# Patient Record
Sex: MALE | Race: WHITE | Employment: FULL TIME | URBAN - METROPOLITAN AREA
[De-identification: names, ages, dates, MRNs, and addresses within clinical notes are randomized per-mention and may not be internally consistent; named-entity substitution may affect disease eponyms.]

---

## 2017-03-02 ENCOUNTER — VIRTUAL VISIT (OUTPATIENT)
Dept: FAMILY MEDICINE | Facility: OTHER | Age: 57
End: 2017-03-02

## 2017-03-02 NOTE — PROGRESS NOTES
Date:   Clinician: Elie Moseley  Clinician NPI: [id number]  Patient: Audi Aguilar  Patient : 1960  Patient Address: [address] [place] [address]  Patient Phone: [phone]  Visit Protocol: URI  Patient Summary:  Audi is a 56 year old ( : 1960 ) male who initiated a Zip for evaluation of bronchitis.      His symptoms started gradually 3-6 days ago and consist of rhinitis, dysphagia, cough, post-nasal drainage, hoarse voice, sore throat, chills, nasal congestion, and malaise.   He denies dyspnea, loss of appetite, ear pain, itchy eyes, vomiting, nausea, chest pain, petechial or purpuric rash, facial pain or pressure, headache, myalgias, and fever. He denies a history of facial surgery.   His moderate nasal secretions are clear.   He has a mildly painful sore throat. When Audi swallows liquids or saliva, he experiences mild pain. The patient denies having white spots on the tonsils similar to a sample strep throat image provided. He has not been exposed to Strep. When asked to feel his neck he could not tell if lymph nodes were enlarged. He denies axillary lymphadenopathy.   His moderately severe (cough every 5-10 minutes) productive cough is NOT more bothersome at night. He believes the cough is not caused by post-nasal drainage. His cough produces green sputum. He experiences mild wheezing on a regular basis.   He denies rigors.   Audi denies having COPD or other chronic lung disease.   Pulse: self-reported pulse rate as: 15 beats in 10 seconds.   Audi started taking an angiotensin receptor blocker (ARB) more than two weeks ago.   Weight (in lbs): 200   Audi does not smoke or use smokeless tobacco.   Additional information as reported by the patient (free text): Been taking Nyquil at night to sleep easier. Didn't see it on the list.   MEDICATIONS:  Losartan (Cozaar) and atorvastatin (Lipitor)   Patient free text response:  None  , ALLERGIES:  NKDA   Clinician Response:   Dear Audi,  Based on the information you have provided, you likely have viral bronchitis.   I am prescribing benzonatate (Tessalon Perles) 100 mg to treat your cough. Take one to two tablets by mouth three times a day as needed for cough. There are no refills with this prescription.   Unless your are allergic to the over-the-counter medication(s) below, I recommend using:   Guaifenesin + dextromethorphan (Robitussin DM, Mucinex DM). This is an over-the-counter medication that does not require a prescription.   A decongestant such as pseudoephedrine (Sudafed or store brand) to help your symptoms. This is an over-the-counter medication that does not require a prescription.   Ibuprofen. Take 1-3 200mg tablets (200-600mg) every 8 hours to help with the discomfort. Make sure to take the ibuprofen with food. Do not exceed 2400mg in 24 hours. This is an over-the-counter medication that does not require a prescription.   Try the following to help with your throat pain and discomfort:     Use throat lozenges    Gargle with warm salt water (1/4 teaspoon of salt per 8 ounce glass of water).    Suck on frozen items such as Popsicles or ice cubes.     Call 911 or go to the emergency room if you feel that your throat is closing off, you suddenly develop a rash, you are unable to swallow fluids, you are drooling, or you are having difficulty breathing.  Follow up with your primary care clinician if your symptoms are not improving in 3-4 days.   Because your condition is most likely caused by a virus, antibiotics will not help you get better. Inappropriately treating a viral infection with antibiotics may cause harmful side-effects. In fact, antibiotics may make you feel worse.  For more information on why I am not prescribing antibiotics, please watch this video: Antibiotics Won't Help You.   Drink plenty of liquids, especially water and take time to rest your body. This may mean taking a nap or going to bed earlier. Your body is  fighting a virus and liquids and rest will improve the pace of recovery. Remember to regularly wash your hands and avoid close contact with others to prevent spreading your condition.   Diagnosis: Viral Bronchitis possible  Diagnosis ICD: J40  Prescription: benzonatate (Tessalon Perles) 100mg oral tablet 30 tablets, 5 days supply. Take one to two tablets by mouth three times a day as needed. disp. 30. Refills: 0, Refill as needed: no, Allow substitutions: yes  Prescription Sent At: March 02 14:59:23, 2017  Pharmacy: [id number] - [phone] - [address] [place] [address]

## 2017-03-14 ENCOUNTER — OFFICE VISIT (OUTPATIENT)
Dept: FAMILY MEDICINE | Facility: CLINIC | Age: 57
End: 2017-03-14
Payer: COMMERCIAL

## 2017-03-14 VITALS
DIASTOLIC BLOOD PRESSURE: 70 MMHG | HEART RATE: 75 BPM | TEMPERATURE: 98.5 F | WEIGHT: 203.4 LBS | SYSTOLIC BLOOD PRESSURE: 118 MMHG | HEIGHT: 71 IN | BODY MASS INDEX: 28.48 KG/M2 | OXYGEN SATURATION: 96 %

## 2017-03-14 DIAGNOSIS — Z11.59 NEED FOR HEPATITIS C SCREENING TEST: ICD-10-CM

## 2017-03-14 DIAGNOSIS — J20.9 ACUTE BRONCHITIS WITH SYMPTOMS > 10 DAYS: Primary | ICD-10-CM

## 2017-03-14 PROCEDURE — 99213 OFFICE O/P EST LOW 20 MIN: CPT | Performed by: PHYSICIAN ASSISTANT

## 2017-03-14 PROCEDURE — 36415 COLL VENOUS BLD VENIPUNCTURE: CPT | Performed by: PHYSICIAN ASSISTANT

## 2017-03-14 PROCEDURE — 86803 HEPATITIS C AB TEST: CPT | Performed by: PHYSICIAN ASSISTANT

## 2017-03-14 RX ORDER — AZITHROMYCIN 250 MG/1
TABLET, FILM COATED ORAL
Qty: 6 TABLET | Refills: 0 | Status: SHIPPED | OUTPATIENT
Start: 2017-03-14 | End: 2017-03-23

## 2017-03-14 NOTE — PROGRESS NOTES
"  SUBJECTIVE:                                                      SUBJECTIVE:  Audi Aguilar is a 56 year old male who presents to the clinic today with a chief complaint of cough  for 2.5 week(s).  His cough is described as productive of yellow sputum. Patient has worsened over the past couple of days.    The patient's symptoms are severe and worsening.  Associated symptoms include congestion, rhinorrhea and wheezing. The patient's symptoms are exacerbated by no particular triggers  Patient has been using Tessalon  to improve symptoms. Non Smoker.     Past Medical History   Diagnosis Date     Depressive disorder, not elsewhere classified      Migraine, unspecified, without mention of intractable migraine without mention of status migrainosus        Current Outpatient Prescriptions   Medication Sig Dispense Refill     losartan (COZAAR) 50 MG tablet Take 1 tablet (50 mg) by mouth daily 90 tablet prn     atorvastatin (LIPITOR) 20 MG tablet Take 1 tablet (20 mg) by mouth daily 90 tablet 3     Calcium Polycarbophil (FIBER-CAPS PO)        KRILL OIL PO Take by mouth daily       CALCIUM CITRATE daily       Coenzyme Q10 (CO Q 10 PO) Take  by mouth.       Multiple Vitamin (DAILY MULTIVITAMIN PO) Take  by mouth.       Cholecalciferol (VITAMIN D-3 PO) Take  by mouth.         Social History   Substance Use Topics     Smoking status: Never Smoker     Smokeless tobacco: Never Used     Alcohol use 0.0 oz/week     0 Standard drinks or equivalent per week      Comment: socially       ROS  Review of systems negative except as stated above.    OBJECTIVE:  /70  Pulse 75  Temp 98.5  F (36.9  C) (Oral)  Ht 5' 11.02\" (1.804 m)  Wt 203 lb 6.4 oz (92.3 kg)  SpO2 96%  BMI 28.35 kg/m2  GENERAL APPEARANCE: healthy, alert and no distress  EYES: EOMI,  PERRL, conjunctiva clear  HENT: TM's normal bilaterally, nasal turbinates erythematous, swollen and rhinorrhea clear  NECK: supple, nontender, no lymphadenopathy  RESP: lungs clear to " auscultation - no rales, rhonchi or wheezes  CV: regular rates and rhythm, normal S1 S2, no murmur noted  ABDOMEN:  soft, nontender, no HSM or masses and bowel sounds normal  NEURO: Normal strength and tone, sensory exam grossly normal,  normal speech and mentation  SKIN: no suspicious lesions or rashes    ASSESSMENT/PLAN:  1. Acute bronchitis with symptoms > 10 days        Push fluids and rest. Honey for cough, humidified air at night.  Claritin or Zyrtec daily as well.  - azithromycin (ZITHROMAX) 250 MG tablet; Two tablets first day, then one tablet daily for four days.  Dispense: 6 tablet; Refill: 0    2. Need for hepatitis C screening test  - Hepatitis C Screen Reflex to HCV RNA Quant and Genotype    I have discussed any lab or imaging results, the patient's diagnosis, and my plan of treatment with the patient and/or family. Patient is aware to come back in with worsening symptoms or if no relief despite treatment plan.  Patient voiced understanding and had no further questions.   Shakila Schmitz PA-C

## 2017-03-14 NOTE — MR AVS SNAPSHOT
"              After Visit Summary   3/14/2017    Audi Aguilar    MRN: [id number]           Patient Information     Date Of Birth          1960        Visit Information        Provider Department      3/14/2017 11:20 AM Shakila Schmitz PA-C [facility]        Today's Diagnoses     Acute bronchitis with symptoms > 10 days    -  1    Need for hepatitis C screening test           Follow-ups after your visit        Who to contact     If you have questions or need follow up information about today's clinic visit or your schedule please contact [facility] directly at [phone].  Normal or non-critical lab and imaging results will be communicated to you by [employer], letter or phone within 4 business days after the clinic has received the results. If you do not hear from us within 7 days, please contact the clinic through [employer] or phone. If you have a critical or abnormal lab result, we will notify you by phone as soon as possible.  Submit refill requests through [employer] or call your pharmacy and they will forward the refill request to us. Please allow 3 business days for your refill to be completed.          Additional Information About Your Visit        MyChart Information     [employer] gives you secure access to your electronic health record. If you see a primary care provider, you can also send messages to your care team and make appointments. If you have questions, please call your primary care clinic.  If you do not have a primary care provider, please call [phone] and they will assist you.        Care EveryWhere ID     This is your Care EveryWhere ID. This could be used by other organizations to access your [place] medical records  [id number]        Your Vitals Were     Pulse Temperature Height Pulse Oximetry BMI (Body Mass Index)       75 98.5  F (36.9  C) (Oral) 5' 11.02\" (1.804 m) 96% 28.35 kg/m2        Blood Pressure from Last 3 Encounters:   03/14/17 118/70 "   04/13/16 118/78   04/27/15 122/70    Weight from Last 3 Encounters:   03/14/17 203 lb 6.4 oz (92.3 kg)   04/13/16 209 lb (94.8 kg)   04/27/15 207 lb (93.9 kg)              We Performed the Following     Hepatitis C Screen Reflex to HCV RNA Quant and Genotype          Today's Medication Changes          These changes are accurate as of: 3/14/17 11:37 AM.  If you have any questions, ask your nurse or doctor.               Start taking these medicines.        Dose/Directions    azithromycin 250 MG tablet   Commonly known as:  ZITHROMAX   Used for:  Acute bronchitis with symptoms > 10 days   Started by:  Shakila Schmitz PA-C        Two tablets first day, then one tablet daily for four days.   Quantity:  6 tablet   Refills:  0            Where to get your medicines      These medications were sent to [facility]: water [id number] [facility] [address] [facility] [address]  [address], [facility] [address]    Hours:  24-hours Phone:  [phone]     azithromycin 250 MG tablet                Primary Care Provider Office Phone # Fax #    Angel Roman [phone]       [facility] [address] [address]        Thank you!     Thank you for choosing [facility]  for your care. Our goal is always to provide you with excellent care. Hearing back from our patients is one way we can continue to improve our services. Please take a few minutes to complete the written survey that you may receive in the mail after your visit with us. Thank you!             Your Updated Medication List - Protect others around you: Learn how to safely use, store and throw away your medicines at www.disposemymeds.org.          This list is accurate as of: 3/14/17 11:37 AM.  Always use your most recent med list.                   Brand Name Dispense Instructions for use    atorvastatin 20 MG tablet    LIPITOR    90 tablet    Take 1 tablet  (20 mg) by mouth daily       azithromycin 250 MG tablet    ZITHROMAX    6 tablet    Two tablets first day, then one tablet daily for four days.       CALCIUM CITRATE      daily       CO Q 10 PO      Take  by mouth.       DAILY MULTIVITAMIN PO      Take  by mouth.       FIBER-CAPS PO          KRILL OIL PO      Take by mouth daily       losartan 50 MG tablet    COZAAR    90 tablet    Take 1 tablet (50 mg) by mouth daily       VITAMIN D-3 PO      Take  by mouth.

## 2017-03-14 NOTE — NURSING NOTE
"Chief Complaint   Patient presents with     RECHECK      cough x more than 2 1/2 weeks. had a virtual visit on 03/02/2017 and was given Benazonatte 100mg       Initial /70  Pulse 75  Temp 98.5  F (36.9  C) (Oral)  Ht 5' 11.02\" (1.804 m)  Wt 203 lb 6.4 oz (92.3 kg)  SpO2 96%  BMI 28.35 kg/m2 Estimated body mass index is 28.35 kg/(m^2) as calculated from the following:    Height as of this encounter: 5' 11.02\" (1.804 m).    Weight as of this encounter: 203 lb 6.4 oz (92.3 kg).  Medication Reconciliation: complete     An DEBORAH Moreno    "

## 2017-03-15 LAB — HCV AB SERPL QL IA: NORMAL

## 2017-03-20 ENCOUNTER — MYC MEDICAL ADVICE (OUTPATIENT)
Dept: FAMILY MEDICINE | Facility: CLINIC | Age: 57
End: 2017-03-20

## 2017-03-21 ENCOUNTER — MYC MEDICAL ADVICE (OUTPATIENT)
Dept: FAMILY MEDICINE | Facility: CLINIC | Age: 57
End: 2017-03-21

## 2017-03-21 ENCOUNTER — TRANSFERRED RECORDS (OUTPATIENT)
Dept: HEALTH INFORMATION MANAGEMENT | Facility: CLINIC | Age: 57
End: 2017-03-21

## 2017-03-21 LAB
ALT SERPL-CCNC: 28 U/L (ref 0–45)
AST SERPL-CCNC: 20 U/L (ref 0–40)
CREAT SERPL-MCNC: 1.06 MG/DL (ref 0.7–1.3)
GFR SERPL CREATININE-BSD FRML MDRD: >60 ML/MIN/1.73M2
GLUCOSE SERPL-MCNC: 102 MG/DL (ref 70–125)
POTASSIUM SERPL-SCNC: 4.4 MMOL/L (ref 3.5–5)

## 2017-03-21 NOTE — TELEPHONE ENCOUNTER
Next 5 appointments (look out 90 days)     Mar 23, 2017  2:30 PM CDT   SHORT with Angel Roman MD   [facility] ([facility])    [address] [place]  [facility] [address] [phone]            Apr 10, 2017  3:45 PM CDT   MyChart Physical Adult with Angel Roman MD   [facility] ([facility])    [address] [place]  [facility] [address] [phone]

## 2017-03-21 NOTE — TELEPHONE ENCOUNTER
MP,  Please see below [email or web address] message.  You have a couple 15 minute openings this week.  Ok to use 15min spot for ER f/u?  Please advise.  Jacklyn GOMES RN

## 2017-03-23 ENCOUNTER — OFFICE VISIT (OUTPATIENT)
Dept: FAMILY MEDICINE | Facility: CLINIC | Age: 57
End: 2017-03-23
Payer: COMMERCIAL

## 2017-03-23 VITALS
SYSTOLIC BLOOD PRESSURE: 104 MMHG | BODY MASS INDEX: 28.21 KG/M2 | DIASTOLIC BLOOD PRESSURE: 74 MMHG | TEMPERATURE: 96.4 F | RESPIRATION RATE: 14 BRPM | WEIGHT: 201.5 LBS | HEIGHT: 71 IN | OXYGEN SATURATION: 97 % | HEART RATE: 80 BPM

## 2017-03-23 DIAGNOSIS — S16.1XXA STRAIN OF NECK MUSCLE, INITIAL ENCOUNTER: Primary | ICD-10-CM

## 2017-03-23 PROCEDURE — 99213 OFFICE O/P EST LOW 20 MIN: CPT | Performed by: FAMILY MEDICINE

## 2017-03-23 NOTE — NURSING NOTE
"Chief Complaint   Patient presents with     Hospital F/U     [facility] 3-21-17       Initial /74 (BP Location: Right arm, Patient Position: Chair, Cuff Size: Adult Large)  Pulse 80  Temp 96.4  F (35.8  C) (Oral)  Resp 14  Ht 5' 11.25\" (1.81 m)  Wt 201 lb 8 oz (91.4 kg)  SpO2 97%  BMI 27.91 kg/m2 Estimated body mass index is 27.91 kg/(m^2) as calculated from the following:    Height as of this encounter: 5' 11.25\" (1.81 m).    Weight as of this encounter: 201 lb 8 oz (91.4 kg).  BP completed using cuff size: large    Health Maintenance that is potentially due pending provider review:  Health Maintenance Due   Topic Date Due     ADVANCE DIRECTIVE PLANNING Q5 YRS (NO INBASKET)  07/15/1978         Brochure given to pt.  "

## 2017-03-23 NOTE — PROGRESS NOTES
Chief Complaint   Patient presents with     Hospital F/U     [facility] 3-21-17   bi-lat arm discomfort and neck discomfort-  See TE encounters  See notes in care everywhere as well. He woke up 3 nights agowith some numbness in his arms mostly in the antecubital area, both sides, and then he noticed similar numbness and tingling in the neck and his wife checked his blood pressure which was very high so he went to the hospital. They did a negative cardiac workup and normal chest x-ray and neck x-ray. Over the next day his symptoms have mostly gone away although there is just a very vague tingling feeling in the arms. His blood pressure came down nicely. He can't recall any injury. Interestingly his father ended up needing Surgery and now low back surgery He has a nice regular exercise program involving stationary bike and a 7 minute workout. He scheduled for his physical next month    Objective: Neck and arms are normal. Heart is regular without murmurs.    Assessment and plan: This sounds mechanical and it seems to be going away. I don't think there was ever any real indication that it was cardiac. I think we can just watch it at this point. If it starts happening more frequently I would consider physical therapy or chiropractic or both

## 2017-03-23 NOTE — MR AVS SNAPSHOT
After Visit Summary   3/23/2017    Audi Aguilar    MRN: [id number]           Patient Information     Date Of Birth          1960        Visit Information        Provider Department      3/23/2017 2:30 PM Angel Roman MD [facility]        Today's Diagnoses     Strain of neck muscle, initial encounter    -  1       Follow-ups after your visit        Your next 10 appointments already scheduled     Apr 10, 2017  3:45 PM CDT   [facility]   [facility] ([facility])    [address] [facility] [address] [phone]              Who to contact     If you have questions or need follow up information about today's clinic visit or your schedule please contact [facility] directly at [phone].  Normal or non-critical lab and imaging results will be communicated to you by MyChart, letter or phone within 4 business days after the clinic has received the results. If you do not hear from us within 7 days, please contact the clinic through [employer] or phone. If you have a critical or abnormal lab result, we will notify you by phone as soon as possible.  Submit refill requests through [employer] or call your pharmacy and they will forward the refill request to us. Please allow 3 business days for your refill to be completed.          Additional Information About Your Visit        MyChart Information     [employer] gives you secure access to your electronic health record. If you see a primary care provider, you can also send messages to your care team and make appointments. If you have questions, please call your primary care clinic.  If you do not have a primary care provider, please call [phone] and they will assist you.        Care EveryWhere ID     This is your Care EveryWhere ID. This could be used by other organizations to access your [place] medical records  [id number]        Your Vitals Were  "    Pulse Temperature Respirations Height Pulse Oximetry BMI (Body Mass Index)    80 96.4  F (35.8  C) (Oral) 14 5' 11.25\" (1.81 m) 97% 27.91 kg/m2       Blood Pressure from Last 3 Encounters:   03/23/17 104/74   03/14/17 118/70   04/13/16 118/78    Weight from Last 3 Encounters:   03/23/17 201 lb 8 oz (91.4 kg)   03/14/17 203 lb 6.4 oz (92.3 kg)   04/13/16 209 lb (94.8 kg)              Today, you had the following     No orders found for display       Primary Care Provider Office Phone # Fax #    Angel Roman [phone]       [facility] [address] [address]        Thank you!     Thank you for choosing [facility]  for your care. Our goal is always to provide you with excellent care. Hearing back from our patients is one way we can continue to improve our services. Please take a few minutes to complete the written survey that you may receive in the mail after your visit with us. Thank you!             Your Updated Medication List - Protect others around you: Learn how to safely use, store and throw away your medicines at www.disposemymeds.org.          This list is accurate as of: 3/23/17  3:00 PM.  Always use your most recent med list.                   Brand Name Dispense Instructions for use    atorvastatin 20 MG tablet    LIPITOR    90 tablet    Take 1 tablet (20 mg) by mouth daily       CALCIUM CITRATE      daily       CO Q 10 PO      Take  by mouth.       DAILY MULTIVITAMIN PO      Take  by mouth.       FIBER-CAPS PO          KRILL OIL PO      Take by mouth daily       losartan 50 MG tablet    COZAAR    90 tablet    Take 1 tablet (50 mg) by mouth daily       VITAMIN D-3 PO      Take  by mouth.         "

## 2017-04-10 ENCOUNTER — OFFICE VISIT (OUTPATIENT)
Dept: FAMILY MEDICINE | Facility: CLINIC | Age: 57
End: 2017-04-10
Payer: COMMERCIAL

## 2017-04-10 VITALS
DIASTOLIC BLOOD PRESSURE: 74 MMHG | HEIGHT: 71 IN | BODY MASS INDEX: 28.28 KG/M2 | OXYGEN SATURATION: 98 % | WEIGHT: 202 LBS | TEMPERATURE: 97.1 F | HEART RATE: 67 BPM | RESPIRATION RATE: 14 BRPM | SYSTOLIC BLOOD PRESSURE: 114 MMHG

## 2017-04-10 DIAGNOSIS — E78.5 HYPERLIPIDEMIA LDL GOAL <160: ICD-10-CM

## 2017-04-10 DIAGNOSIS — I10 HYPERTENSION GOAL BP (BLOOD PRESSURE) < 140/90: ICD-10-CM

## 2017-04-10 DIAGNOSIS — Z00.01 ENCOUNTER FOR ROUTINE ADULT HEALTH EXAMINATION WITH ABNORMAL FINDINGS: Primary | ICD-10-CM

## 2017-04-10 PROCEDURE — 99396 PREV VISIT EST AGE 40-64: CPT | Performed by: FAMILY MEDICINE

## 2017-04-10 RX ORDER — ATORVASTATIN CALCIUM 20 MG/1
20 TABLET, FILM COATED ORAL DAILY
Qty: 90 TABLET | Refills: 3 | Status: SHIPPED | OUTPATIENT
Start: 2017-04-10 | End: 2018-04-20

## 2017-04-10 RX ORDER — LOSARTAN POTASSIUM 50 MG/1
50 TABLET ORAL DAILY
Qty: 90 TABLET | Status: SHIPPED | OUTPATIENT
Start: 2017-04-10 | End: 2018-04-20

## 2017-04-10 NOTE — PROGRESS NOTES
SUBJECTIVE:     CC: Audi Aguilar is an 56 year old male who presents for preventative health visit.     Healthy Habits:    Do you get at least three servings of calcium containing foods daily (dairy, green leafy vegetables, etc.)? no, taking calcium and/or vitamin D supplement: yes - both    Amount of exercise or daily activities, outside of work: 3-4 day(s) per week    Problems taking medications regularly No    Medication side effects: No    Have you had an eye exam in the past two years? yes    Do you see a dentist twice per year? yes    Do you have sleep apnea, excessive snoring or daytime drowsiness?no    Concerns: none        Today's PHQ-2 Score:   PHQ-2 ( 1999 Pfizer) 4/13/2016 4/7/2016   Q1: Little interest or pleasure in doing things 0 -   Q2: Feeling down, depressed or hopeless 0 -   PHQ-2 Score 0 -   Little interest or pleasure in doing things - Not at all   Feeling down, depressed or hopeless - Not at all   PHQ-2 Score - 0     Abuse: Current or Past(Physical, Sexual or Emotional)- No  Do you feel safe in your environment - Yes    Social History   Substance Use Topics     Smoking status: Never Smoker     Smokeless tobacco: Never Used     Alcohol use 0.0 oz/week     0 Standard drinks or equivalent per week      Comment: socially     The patient does not drink >3 drinks per day nor >7 drinks per week.    Last PSA: No results found for: PSA    Recent Labs   Lab Test  04/12/16   0742  04/27/15   0922  07/01/14   0745   CHOL  183  190  181   HDL  49  49  38*   LDL  108*  111  110   TRIG  130  152*  162*   CHOLHDLRATIO   --   3.9  4.7   NHDL  134*   --    --        Reviewed orders with patient. Reviewed health maintenance and updated orders accordingly - Yes    Reviewed and updated as needed this visit by clinical staff  Allergies  Meds  Problems         Reviewed and updated as needed this visit by Provider  Meds  Problems            ROS:no problems-will get fastinglabs closer to home  C: NEGATIVE for  fever, chills, change in weight  I: NEGATIVE for worrisome rashes, moles or lesions  E: NEGATIVE for vision changes or irritation  ENT: NEGATIVE for ear, mouth and throat problems  R: NEGATIVE for significant cough or SOB  CV: NEGATIVE for chest pain, palpitations or peripheral edema  GI: NEGATIVE for nausea, abdominal pain, heartburn, or change in bowel habits   male: negative for dysuria, hematuria, decreased urinary stream, erectile dysfunction, urethral discharge  M: NEGATIVE for significant arthralgias or myalgia  N: NEGATIVE for weakness, dizziness or paresthesias  P: NEGATIVE for changes in mood or affect    Problem list, Medication list, Allergies, and Medical/Social/Surgical histories reviewed in [facility] and updated as appropriate.  OBJECTIVE:     There were no vitals taken for this visit.  EXAM:  GENERAL: healthy, alert and no distress  EYES: Eyes grossly normal to inspection, PERRL and conjunctivae and sclerae normal  HENT: ear canals and TM's normal, nose and mouth without ulcers or lesions  NECK: no adenopathy, no asymmetry, masses, or scars and thyroid normal to palpation  RESP: lungs clear to auscultation - no rales, rhonchi or wheezes  CV: regular rate and rhythm, normal S1 S2, no S3 or S4, no murmur, click or rub, no peripheral edema and peripheral pulses strong  ABDOMEN: soft, nontender, no hepatosplenomegaly, no masses and bowel sounds normal   (male): normal male genitalia without lesions or urethral discharge, no hernia  RECTAL: normal sphincter tone, no rectal masses, prostate normal size, smooth, nontender without nodules or masses  MS: no gross musculoskeletal defects noted, no edema  SKIN: no suspicious lesions or rashes  NEURO: Normal strength and tone, mentation intact and speech normal  PSYCH: mentation appears normal, affect normal/bright    ASSESSMENT/PLAN:     1. Encounter for routine adult health examination with abnormal findings      2. Hypertension goal BP (blood pressure) <  "140/90    - losartan (COZAAR) 50 MG tablet; Take 1 tablet (50 mg) by mouth daily  Dispense: 90 tablet; Refill: prn  - Basic metabolic panel; Future  - Albumin Random Urine Quantitative; Future    3. Hyperlipidemia LDL goal <160    - atorvastatin (LIPITOR) 20 MG tablet; Take 1 tablet (20 mg) by mouth daily  Dispense: 90 tablet; Refill: 3  - Lipid Profile with reflex to direct LDL; Future    COUNSELING:  Reviewed preventive health counseling, as reflected in patient instructions       Regular exercise       Healthy diet/nutrition         reports that he has never smoked. He has never used smokeless tobacco.    Estimated body mass index is 27.91 kg/(m^2) as calculated from the following:    Height as of 3/23/17: 5' 11.25\" (1.81 m).    Weight as of 3/23/17: 201 lb 8 oz (91.4 kg).   Weight management plan: Discussed healthy diet and exercise guidelines and patient will follow up in 12 months in clinic to re-evaluate.    Counseling Resources:  ATP IV Guidelines  Pooled Cohorts Equation Calculator  FRAX Risk Assessment  ICSI Preventive Guidelines  Dietary Guidelines for Americans, 2010  USDA's MyPlate  ASA Prophylaxis  Lung CA Screening    Angel Roman MD  [facility]  "

## 2017-04-10 NOTE — NURSING NOTE
"Chief Complaint   Patient presents with     Physical     Recheck Medication     follow up for refills       Initial /74 (BP Location: Right arm, Patient Position: Chair, Cuff Size: Adult Regular)  Pulse 67  Temp 97.1  F (36.2  C) (Oral)  Resp 14  SpO2 98% Estimated body mass index is 27.91 kg/(m^2) as calculated from the following:    Height as of 3/23/17: 5' 11.25\" (1.81 m).    Weight as of 3/23/17: 201 lb 8 oz (91.4 kg).  BP completed using cuff size: regular    Health Maintenance that is potentially due pending provider review:  Health Maintenance Due   Topic Date Due     ADVANCE DIRECTIVE PLANNING Q5 YRS (NO INBASKET)  07/15/1978         Brochure given to pt.  "

## 2017-04-10 NOTE — MR AVS SNAPSHOT
After Visit Summary   4/10/2017    Audi Aguilar    MRN: [id number]           Patient Information     Date Of Birth          1960        Visit Information        Provider Department      4/10/2017 3:45 PM Angel Roman MD [facility]        Today's Diagnoses     Encounter for routine adult health examination with abnormal findings    -  1    Hypertension goal BP (blood pressure) < 140/90        Hyperlipidemia LDL goal <160          Care Instructions      Preventive Health Recommendations  Male Ages 50 - 64    Yearly exam:             See your health care provider every year in order to  o   Review health changes.   o   Discuss preventive care.    o   Review your medicines if your doctor has prescribed any.     Have a cholesterol test every 5 years, or more frequently if you are at risk for high cholesterol/heart disease.     Have a diabetes test (fasting glucose) every three years. If you are at risk for diabetes, you should have this test more often.     Have a colonoscopy at age 50, or have a yearly FIT test (stool test). These exams will check for colon cancer.      Talk with your health care provider about whether or not a prostate cancer screening test (PSA) is right for you.    You should be tested each year for STDs (sexually transmitted diseases), if you re at risk.     Shots: Get a flu shot each year. Get a tetanus shot every 10 years.     Nutrition:    Eat at least 5 servings of fruits and vegetables daily.     Eat whole-grain bread, whole-wheat pasta and brown rice instead of white grains and rice.     Talk to your provider about Calcium and Vitamin D.     Lifestyle    Exercise for at least 150 minutes a week (30 minutes a day, 5 days a week). This will help you control your weight and prevent disease.     Limit alcohol to one drink per day.     No smoking.     Wear sunscreen to prevent skin cancer.     See your dentist every six months for an exam and cleaning.     See  "your eye doctor every 1 to 2 years.          Follow-ups after your visit        Future tests that were ordered for you today     Open Future Orders        Priority Expected Expires Ordered    Lipid Profile with reflex to direct LDL Routine 5/10/2017 4/10/2018 4/10/2017    Basic metabolic panel Routine 5/10/2017 4/10/2018 4/10/2017    Albumin Random Urine Quantitative Routine 5/10/2017 4/10/2018 4/10/2017            Who to contact     If you have questions or need follow up information about today's clinic visit or your schedule please contact [facility] directly at [phone].  Normal or non-critical lab and imaging results will be communicated to you by MyChart, letter or phone within 4 business days after the clinic has received the results. If you do not hear from us within 7 days, please contact the clinic through Eteloshart or phone. If you have a critical or abnormal lab result, we will notify you by phone as soon as possible.  Submit refill requests through [employer] or call your pharmacy and they will forward the refill request to us. Please allow 3 business days for your refill to be completed.          Additional Information About Your Visit        MyChart Information     [employer] gives you secure access to your electronic health record. If you see a primary care provider, you can also send messages to your care team and make appointments. If you have questions, please call your primary care clinic.  If you do not have a primary care provider, please call [phone] and they will assist you.        Care EveryWhere ID     This is your Care EveryWhere ID. This could be used by other organizations to access your [place] medical records  [id number]        Your Vitals Were     Pulse Temperature Respirations Height Pulse Oximetry BMI (Body Mass Index)    67 97.1  F (36.2  C) (Oral) 14 5' 11.25\" (1.81 m) 98% 27.98 kg/m2       Blood Pressure from Last 3 Encounters:   04/10/17 114/74   03/23/17 104/74 "   03/14/17 118/70    Weight from Last 3 Encounters:   04/10/17 202 lb (91.6 kg)   03/23/17 201 lb 8 oz (91.4 kg)   03/14/17 203 lb 6.4 oz (92.3 kg)                 Where to get your medicines      These medications were sent to [id number] - [place], [place] - [address] AT [facility] [address]  [address] [address]    Hours:  24-hours Phone:  [phone]     atorvastatin 20 MG tablet    losartan 50 MG tablet          Primary Care Provider Office Phone # Fax #    Angel Roman [phone]       [facility] [address] [facility]  [address]  [facility] [address]        Thank you!     Thank you for choosing [facility]  for your care. Our goal is always to provide you with excellent care. Hearing back from our patients is one way we can continue to improve our services. Please take a few minutes to complete the written survey that you may receive in the mail after your visit with us. Thank you!             Your Updated Medication List - Protect others around you: Learn how to safely use, store and throw away your medicines at www.disposemymeds.org.          This list is accurate as of: 4/10/17  4:36 PM.  Always use your most recent med list.                   Brand Name Dispense Instructions for use    atorvastatin 20 MG tablet    LIPITOR    90 tablet    Take 1 tablet (20 mg) by mouth daily       CALCIUM CITRATE      daily       CO Q 10 PO      Take  by mouth.       DAILY MULTIVITAMIN PO      Take  by mouth.       FIBER-CAPS PO          KRILL OIL PO      Take by mouth daily Reported on 4/10/2017       losartan 50 MG tablet    COZAAR    90 tablet    Take 1 tablet (50 mg) by mouth daily       VITAMIN D-3 PO      Take  by mouth.

## 2017-04-14 DIAGNOSIS — I10 HYPERTENSION GOAL BP (BLOOD PRESSURE) < 140/90: ICD-10-CM

## 2017-04-14 DIAGNOSIS — E78.5 HYPERLIPIDEMIA LDL GOAL <160: ICD-10-CM

## 2017-04-14 LAB
ANION GAP SERPL CALCULATED.3IONS-SCNC: 9 MMOL/L (ref 3–14)
BUN SERPL-MCNC: 21 MG/DL (ref 7–30)
CALCIUM SERPL-MCNC: 9.4 MG/DL (ref 8.5–10.1)
CHLORIDE SERPL-SCNC: 101 MMOL/L (ref 94–109)
CHOLEST SERPL-MCNC: 146 MG/DL
CO2 SERPL-SCNC: 27 MMOL/L (ref 20–32)
CREAT SERPL-MCNC: 1.17 MG/DL (ref 0.66–1.25)
CREAT UR-MCNC: 172 MG/DL
GFR SERPL CREATININE-BSD FRML MDRD: 64 ML/MIN/1.7M2
GLUCOSE SERPL-MCNC: 95 MG/DL (ref 70–99)
HDLC SERPL-MCNC: 61 MG/DL
LDLC SERPL CALC-MCNC: 70 MG/DL
MICROALBUMIN UR-MCNC: 14 MG/L
MICROALBUMIN/CREAT UR: 7.91 MG/G CR (ref 0–17)
NONHDLC SERPL-MCNC: 85 MG/DL
POTASSIUM SERPL-SCNC: 4.4 MMOL/L (ref 3.4–5.3)
SODIUM SERPL-SCNC: 137 MMOL/L (ref 133–144)
TRIGL SERPL-MCNC: 73 MG/DL

## 2017-04-14 PROCEDURE — 36415 COLL VENOUS BLD VENIPUNCTURE: CPT | Performed by: FAMILY MEDICINE

## 2017-04-14 PROCEDURE — 82043 UR ALBUMIN QUANTITATIVE: CPT | Performed by: FAMILY MEDICINE

## 2017-04-14 PROCEDURE — 80048 BASIC METABOLIC PNL TOTAL CA: CPT | Performed by: FAMILY MEDICINE

## 2017-04-14 PROCEDURE — 80061 LIPID PANEL: CPT | Performed by: FAMILY MEDICINE

## 2018-04-02 DIAGNOSIS — E78.5 HYPERLIPIDEMIA LDL GOAL <160: ICD-10-CM

## 2018-04-03 RX ORDER — ATORVASTATIN CALCIUM 20 MG/1
20 TABLET, FILM COATED ORAL DAILY
Qty: 30 TABLET | Refills: 0 | Status: CANCELLED | OUTPATIENT
Start: 2018-04-03

## 2018-04-03 RX ORDER — ATORVASTATIN CALCIUM 20 MG/1
TABLET, FILM COATED ORAL
Start: 2018-04-03

## 2018-04-03 NOTE — TELEPHONE ENCOUNTER
"Requested Prescriptions   Refused Prescriptions Disp Refills     atorvastatin (LIPITOR) 20 MG tablet [Pharmacy Med Name: ATORVASTATIN 20MG TABLETS]       Sig: TAKE 1 TABLET(20 MG) BY MOUTH DAILY    Statins Protocol Passed    4/2/2018  1:31 PM       Passed - LDL on file in past 12 months    Recent Labs   Lab Test  04/14/17   0729   LDL  70            Passed - No abnormal creatine kinase in past 12 months    No lab results found.            Passed - Recent (12 mo) or future (30 days) visit within the authorizing provider's specialty    Patient had office visit in the last 12 months or has a visit in the next 30 days with authorizing provider or within the authorizing provider's specialty.  See \"Patient Info\" tab in inbasket, or \"Choose Columns\" in Meds & Orders section of the refill encounter.           Passed - Patient is age 18 or older        "

## 2018-04-03 NOTE — TELEPHONE ENCOUNTER
Former [facility] patient.  Needs to establish care with new PCP.  Health[employer] message sent to patient.  Odilia Barth RN

## 2018-04-06 NOTE — TELEPHONE ENCOUNTER
Patient has not read [facility] message   Called pt - advised appt  States he will be going to [facility]  Not scheduled yet though - will call them to schedule appt  Has 2 weeks of Atorvastatin left  Will call us back if running out of med before appt with new PCP  Jacklyn GOMES RN

## 2018-04-23 ENCOUNTER — OFFICE VISIT (OUTPATIENT)
Dept: FAMILY MEDICINE | Facility: CLINIC | Age: 58
End: 2018-04-23
Payer: COMMERCIAL

## 2018-04-23 VITALS
HEART RATE: 85 BPM | WEIGHT: 203 LBS | TEMPERATURE: 97.1 F | DIASTOLIC BLOOD PRESSURE: 82 MMHG | OXYGEN SATURATION: 97 % | HEIGHT: 71 IN | BODY MASS INDEX: 28.42 KG/M2 | RESPIRATION RATE: 13 BRPM | SYSTOLIC BLOOD PRESSURE: 126 MMHG

## 2018-04-23 DIAGNOSIS — E78.5 HYPERLIPIDEMIA LDL GOAL <160: ICD-10-CM

## 2018-04-23 DIAGNOSIS — R39.198 SLOW URINARY STREAM: ICD-10-CM

## 2018-04-23 DIAGNOSIS — I10 HYPERTENSION GOAL BP (BLOOD PRESSURE) < 140/90: ICD-10-CM

## 2018-04-23 DIAGNOSIS — Z00.00 ROUTINE PHYSICAL EXAMINATION: Primary | ICD-10-CM

## 2018-04-23 LAB
ALT SERPL W P-5'-P-CCNC: 26 U/L (ref 0–70)
ANION GAP SERPL CALCULATED.3IONS-SCNC: 6 MMOL/L (ref 3–14)
BUN SERPL-MCNC: 23 MG/DL (ref 7–30)
CALCIUM SERPL-MCNC: 9.1 MG/DL (ref 8.5–10.1)
CHLORIDE SERPL-SCNC: 105 MMOL/L (ref 94–109)
CHOLEST SERPL-MCNC: 175 MG/DL
CO2 SERPL-SCNC: 30 MMOL/L (ref 20–32)
CREAT SERPL-MCNC: 1.07 MG/DL (ref 0.66–1.25)
GFR SERPL CREATININE-BSD FRML MDRD: 71 ML/MIN/1.7M2
GLUCOSE SERPL-MCNC: 97 MG/DL (ref 70–99)
HDLC SERPL-MCNC: 57 MG/DL
LDLC SERPL CALC-MCNC: 97 MG/DL
NONHDLC SERPL-MCNC: 118 MG/DL
POTASSIUM SERPL-SCNC: 4.3 MMOL/L (ref 3.4–5.3)
SODIUM SERPL-SCNC: 141 MMOL/L (ref 133–144)
TRIGL SERPL-MCNC: 104 MG/DL

## 2018-04-23 PROCEDURE — 80061 LIPID PANEL: CPT | Performed by: FAMILY MEDICINE

## 2018-04-23 PROCEDURE — 36415 COLL VENOUS BLD VENIPUNCTURE: CPT | Performed by: FAMILY MEDICINE

## 2018-04-23 PROCEDURE — 99212 OFFICE O/P EST SF 10 MIN: CPT | Mod: 25 | Performed by: FAMILY MEDICINE

## 2018-04-23 PROCEDURE — 84460 ALANINE AMINO (ALT) (SGPT): CPT | Performed by: FAMILY MEDICINE

## 2018-04-23 PROCEDURE — 80048 BASIC METABOLIC PNL TOTAL CA: CPT | Performed by: FAMILY MEDICINE

## 2018-04-23 PROCEDURE — 99396 PREV VISIT EST AGE 40-64: CPT | Performed by: FAMILY MEDICINE

## 2018-04-23 RX ORDER — LOSARTAN POTASSIUM 50 MG/1
50 TABLET ORAL DAILY
Qty: 90 TABLET | Refills: 3 | Status: SHIPPED | OUTPATIENT
Start: 2018-04-23 | End: 2019-04-09

## 2018-04-23 RX ORDER — ATORVASTATIN CALCIUM 20 MG/1
20 TABLET, FILM COATED ORAL DAILY
Qty: 90 TABLET | Refills: 3 | Status: SHIPPED | OUTPATIENT
Start: 2018-04-23 | End: 2019-04-09

## 2018-04-23 NOTE — PATIENT INSTRUCTIONS
[facility]    If you have any questions regarding to your visit please contact your care team:       Team Purple:   Clinic Hours Telephone Number   Dr. Cheryl Palacios   7am-7pm  Monday - Thursday   7am-5pm  Fridays  [phone]  (Appointment scheduling available 24/7)    Questions about your Visit?   Team Line:  [phone]   Urgent Care - [place] [facility] - 11am-9pm Monday-Friday Saturday-Sunday- 9am-5pm   [place] - 5pm-9pm Monday-Friday Saturday-Sunday- 9am-5pm  [phone] - [facility]  [phone] - [place]       What options do I have for visits at the clinic other than the traditional office visit?  To expand how we care for you, many of our providers are utilizing electronic visits (e-visits) and telephone visits, when medically appropriate, for interactions with their patients rather than a visit in the clinic.   We also offer nurse visits for many medical concerns. Just like any other service, we will bill your insurance company for this type of visit based on time spent on the phone with your provider. Not all insurance companies cover these visits. Please check with your medical insurance if this type of visit is covered. You will be responsible for any charges that are not paid by your insurance.      E-visits via [employer]:  generally incur a $35.00 fee.  Telephone visits:  Time spent on the phone: *charged based on time that is spent on the phone in increments of 10 minutes. Estimated cost:   5-10 mins $30.00   11-20 mins. $59.00   21-30 mins. $85.00     Use YuanVhart (secure email communication and access to your chart) to send your primary care provider a message or make an appointment. Ask someone on your Team how to sign up for Weroom.  For a Price Quote for your services, please call our Consumer Price Line at [phone].  As always, Thank you for trusting us with your health care  needs!    Terence Ortiz    Preventive Health Recommendations  Male Ages 50   64    Yearly exam:             See your health care provider every year in order to  o   Review health changes.   o   Discuss preventive care.    o   Review your medicines if your doctor has prescribed any.     Have a cholesterol test every 5 years, or more frequently if you are at risk for high cholesterol/heart disease.     Have a diabetes test (fasting glucose) every three years. If you are at risk for diabetes, you should have this test more often.     Have a colonoscopy at age 50, or have a yearly FIT test (stool test). These exams will check for colon cancer.      Talk with your health care provider about whether or not a prostate cancer screening test (PSA) is right for you.    You should be tested each year for STDs (sexually transmitted diseases), if you re at risk.     Shots: Get a flu shot each year. Get a tetanus shot every 10 years.     Nutrition:    Eat at least 5 servings of fruits and vegetables daily.     Eat whole-grain bread, whole-wheat pasta and brown rice instead of white grains and rice.     Talk to your provider about Calcium and Vitamin D.     Lifestyle    Exercise for at least 150 minutes a week (30 minutes a day, 5 days a week). This will help you control your weight and prevent disease.     Limit alcohol to one drink per day.     No smoking.     Wear sunscreen to prevent skin cancer.     See your dentist every six months for an exam and cleaning.     See your eye doctor every 1 to 2 years.

## 2018-04-23 NOTE — NURSING NOTE
"Chief Complaint   Patient presents with     Physical     Health Maintenance     ADP      Initial /82 (BP Location: Left arm, Patient Position: Chair, Cuff Size: Adult Regular)  Pulse 85  Temp 97.1  F (36.2  C) (Oral)  Resp 13  Ht 5' 11\" (1.803 m)  Wt 203 lb (92.1 kg)  SpO2 97%  BMI 28.31 kg/m2 Estimated body mass index is 28.31 kg/(m^2) as calculated from the following:    Height as of this encounter: 5' 11\" (1.803 m).    Weight as of this encounter: 203 lb (92.1 kg).  Medication Reconciliation: complete     Terence Ortiz  "

## 2018-04-23 NOTE — PROGRESS NOTES
SUBJECTIVE:   CC: Audi Aguilar is an 57 year old male who presents for preventative health visit.     Healthy Habits:    Do you get at least three servings of calcium containing foods daily (dairy, green leafy vegetables, etc.)? yes    Amount of exercise or daily activities, outside of work: 3 day(s) per week    Problems taking medications regularly No    Medication side effects: No    Have you had an eye exam in the past two years? yes    Do you see a dentist twice per year? yes    Do you have sleep apnea, excessive snoring or daytime drowsiness?no    Concerns:    Frequency and Nocturia x a couple of years.    Also stream takes longer    Dad: had some prostate biopsies and watching it.    Shingrix: for shingles prevention, will wait    Hyperlipidemia Follow-Up    Rate your low fat/cholesterol diet?: fair    Taking statin?  Yes, no muscle aches from statin    Other lipid medications/supplements?:  none    Hypertension Follow-up    Outpatient blood pressures are being checked at home.  Results are 120/70.    Low Salt Diet: low salt    Today's PHQ-2 Score:   PHQ-2 ( 1999 Pfizer) 4/23/2018 4/10/2017   Q1: Little interest or pleasure in doing things 0 0   Q2: Feeling down, depressed or hopeless 0 0   PHQ-2 Score 0 0   Q1: Little interest or pleasure in doing things - -   Q2: Feeling down, depressed or hopeless - -   PHQ-2 Score - -     Abuse: Current or Past(Physical, Sexual or Emotional)- No  Do you feel safe in your environment - Yes    Social History   Substance Use Topics     Smoking status: Never Smoker     Smokeless tobacco: Never Used     Alcohol use 0.0 oz/week      Comment: < 3 drinks a week      If you drink alcohol do you typically have >3 drinks per day or >7 drinks per week? No                      Last PSA: No results found for: PSA    Reviewed orders with patient. Reviewed health maintenance and updated orders accordingly - Yes  Terence Ortiz    Patient Active Problem List   Diagnosis      "Hyperlipidemia LDL goal <160     Hypertension goal BP (blood pressure) < 140/90     Left varicocele     Past Surgical History:   Procedure Laterality Date     C NONSPECIFIC PROCEDURE  1995    Vasectomy     ENT SURGERY  1970ish    Tonsils and adnoids out     GENITOURINARY SURGERY  1995ish    Vasectomy.       Social History   Substance Use Topics     Smoking status: Never Smoker     Smokeless tobacco: Never Used     Alcohol use 0.0 oz/week      Comment: < 3 drinks a week     Family History   Problem Relation Age of Onset     Lipids Father      Hypertension Father      Hyperlipidemia Father      C.A.D. Paternal Grandmother      C.APAU. Paternal Grandfather      Thyroid Disease Mother          Reviewed and updated as needed this visit by clinical staff  Tobacco  Allergies  Meds  Med Hx  Surg Hx  Fam Hx  Soc Hx       ROS:  C: NEGATIVE for fever, chills, change in weight  I: NEGATIVE for worrisome rashes, moles or lesions  E: NEGATIVE for vision changes or irritation  ENT: NEGATIVE for ear, mouth and throat problems  R: NEGATIVE for significant cough or SOB  CV: NEGATIVE for chest pain, palpitations or peripheral edema  GI: NEGATIVE for nausea, abdominal pain, heartburn, or change in bowel habits   male: POSITVE for decreased urinary stream  M: NEGATIVE for significant arthralgias or myalgia  N: NEGATIVE for weakness, dizziness or paresthesias  P: NEGATIVE for changes in mood or affect    OBJECTIVE:   /82 (BP Location: Left arm, Patient Position: Chair, Cuff Size: Adult Regular)  Pulse 85  Temp 97.1  F (36.2  C) (Oral)  Resp 13  Ht 5' 11\" (1.803 m)  Wt 203 lb (92.1 kg)  SpO2 97%  BMI 28.31 kg/m2  EXAM:  GENERAL: healthy, alert and no distress  EYES: Eyes grossly normal to inspection, PERRL and conjunctivae and sclerae normal  HENT: ear canals and TM's normal, nose and mouth without ulcers or lesions  NECK: no adenopathy and thyroid normal to palpation  RESP: lungs clear to auscultation - no rales, " "rhonchi or wheezes  CV: regular rate and rhythm, normal S1 S2, no S3 or S4, no murmur, click or rub, no peripheral edema.  ABDOMEN: soft, nontender, no masses and bowel sounds normal  RECTAL EXAM: Prostate slightly enlarged, smooth, no nodules.  MS: no gross musculoskeletal defects noted, no edema  SKIN: no suspicious lesions or rashes  NEURO: Normal strength and tone, mentation intact and speech normal  PSYCH: mentation appears normal, affect normal/bright    ASSESSMENT/PLAN:   Audi was seen today for physical and health maintenance.    Diagnoses and all orders for this visit:    Routine physical examination    Hyperlipidemia LDL goal <160        On a statin, cholesterol fairly well controlled.  -     atorvastatin (LIPITOR) 20 MG tablet; Take 1 tablet (20 mg) by mouth daily  -     Lipid panel reflex to direct LDL Fasting  -     ALT    Hypertension goal BP (blood pressure) < 140/90  -     losartan (COZAAR) 50 MG tablet; Take 1 tablet (50 mg) by mouth daily  -     Basic metabolic panel    Slow urinary stream        -   Possibly benign BPH    COUNSELING:  Reviewed preventive health counseling, as reflected in patient instructions       Regular exercise       Healthy diet/nutrition    BP Screening:   Last 3 BP Readings:    BP Readings from Last 3 Encounters:   04/23/18 126/82   04/10/17 114/74   03/23/17 104/74       The following was recommended to the patient:  Re-screen BP within a year and recommended lifestyle modifications   reports that he has never smoked. He has never used smokeless tobacco.    Estimated body mass index is 28.31 kg/(m^2) as calculated from the following:    Height as of this encounter: 5' 11\" (1.803 m).    Weight as of this encounter: 203 lb (92.1 kg).   Weight management plan: Discussed healthy diet and exercise guidelines and patient will follow up in 12 months in clinic to re-evaluate.    Counseling Resources:  ATP IV Guidelines  Pooled Cohorts Equation Calculator  FRAX Risk " Assessment  ICSI Preventive Guidelines  Dietary Guidelines for Americans, 2010  USDA's MyPlate  ASA Prophylaxis  Lung CA Screening    Chauncey Mcduffie MD  [facility]

## 2018-04-23 NOTE — MR AVS SNAPSHOT
After Visit Summary   4/23/2018    Audi Aguilar    MRN: [id number]           Patient Information     Date Of Birth          1960        Visit Information        Provider Department      4/23/2018 7:40 AM Chauncey Mcduffie MD [facility]        Today's Diagnoses     Hyperlipidemia LDL goal <160    -  1    Hypertension goal BP (blood pressure) < 140/90        Routine physical examination        Slow urinary stream          Care Instructions    [facility]    If you have any questions regarding to your visit please contact your care team:       Team Purple:   Clinic Hours Telephone Number   Dr. Cheryl Palacios   7am-7pm  Monday - Thursday   7am-5pm  Fridays  [phone]  (Appointment scheduling available 24/7)    Questions about your Visit?   Team Line:  [phone]   [facility] [place] [facility] 11am-9pm Monday-Friday Saturday-Sunday- 9am-5pm   [place] - 5pm-9pm Monday-Friday Saturday-Sunday- 9am-5pm  [phone] - [facility]  [phone] - [place]       What options do I have for visits at the clinic other than the traditional office visit?  To expand how we care for you, many of our providers are utilizing electronic visits (e-visits) and telephone visits, when medically appropriate, for interactions with their patients rather than a visit in the clinic.   We also offer nurse visits for many medical concerns. Just like any other service, we will bill your insurance company for this type of visit based on time spent on the phone with your provider. Not all insurance companies cover these visits. Please check with your medical insurance if this type of visit is covered. You will be responsible for any charges that are not paid by your insurance.      E-visits via [employer]:  generally incur a $35.00 fee.  Telephone visits:  Time spent on the phone: *charged based on time that  is spent on the phone in increments of 10 minutes. Estimated cost:   5-10 mins $30.00   11-20 mins. $59.00   21-30 mins. $85.00     Use documistichart (secure email communication and access to your chart) to send your primary care provider a message or make an appointment. Ask someone on your Team how to sign up for [employer].  For a Price Quote for your services, please call our [employer] Line at [phone].  As always, Thank you for trusting us with your health care needs!    Terence Ortiz    Preventive Health Recommendations  Male Ages 50 - 64    Yearly exam:             See your health care provider every year in order to  o   Review health changes.   o   Discuss preventive care.    o   Review your medicines if your doctor has prescribed any.     Have a cholesterol test every 5 years, or more frequently if you are at risk for high cholesterol/heart disease.     Have a diabetes test (fasting glucose) every three years. If you are at risk for diabetes, you should have this test more often.     Have a colonoscopy at age 50, or have a yearly FIT test (stool test). These exams will check for colon cancer.      Talk with your health care provider about whether or not a prostate cancer screening test (PSA) is right for you.    You should be tested each year for STDs (sexually transmitted diseases), if you re at risk.     Shots: Get a flu shot each year. Get a tetanus shot every 10 years.     Nutrition:    Eat at least 5 servings of fruits and vegetables daily.     Eat whole-grain bread, whole-wheat pasta and brown rice instead of white grains and rice.     Talk to your provider about Calcium and Vitamin D.     Lifestyle    Exercise for at least 150 minutes a week (30 minutes a day, 5 days a week). This will help you control your weight and prevent disease.     Limit alcohol to one drink per day.     No smoking.     Wear sunscreen to prevent skin cancer.     See your dentist every six months for an exam and cleaning.  "    See your eye doctor every 1 to 2 years.            Follow-ups after your visit        Who to contact     If you have questions or need follow up information about today's clinic visit or your schedule please contact [facility] [place] directly at [phone].  Normal or non-critical lab and imaging results will be communicated to you by MyChart, letter or phone within 4 business days after the clinic has received the results. If you do not hear from us within 7 days, please contact the clinic through [employer] or phone. If you have a critical or abnormal lab result, we will notify you by phone as soon as possible.  Submit refill requests through [employer]ob or call your pharmacy and they will forward the refill request to us. Please allow 3 business days for your refill to be completed.          Additional Information About Your Visit        MyChart Information     VMob gives you secure access to your electronic health record. If you see a primary care provider, you can also send messages to your care team and make appointments. If you have questions, please call your primary care clinic.  If you do not have a primary care provider, please call [phone] and they will assist you.        Care EveryWhere ID     This is your Care EveryWhere ID. This could be used by other organizations to access your [facility] medical records  [id number]        Your Vitals Were     Pulse Temperature Respirations Height Pulse Oximetry BMI (Body Mass Index)    85 97.1  F (36.2  C) (Oral) 13 5' 11\" (1.803 m) 97% 28.31 kg/m2       Blood Pressure from Last 3 Encounters:   04/23/18 126/82   04/10/17 114/74   03/23/17 104/74    Weight from Last 3 Encounters:   04/23/18 203 lb (92.1 kg)   04/10/17 202 lb (91.6 kg)   03/23/17 201 lb 8 oz (91.4 kg)              We Performed the Following     ALT     Basic metabolic panel     Lipid panel reflex to direct LDL Fasting          Where to get your medicines      These medications were sent " to [facility] [id number] AT [facility] [address]  [address], [place] [address]     Phone:  [phone]     atorvastatin 20 MG tablet    losartan 50 MG tablet          Primary Care Provider Office Phone # Fax #    Chauncey Mcduffie [phone] [address]  [place] [address]        Equal Access to Services     ALISSON KNUTSON : Hadii aad ku hadasho Soomaali, waaxda luqadaha, qaybta kaalmada adeegyada, waxay idiin hayaan adeeg kharash la'tray . So [facility] [phone].    ATENCIÓN: Si habla español, tiene a adams disposición servicios gratuitos de asistencia lingüística. [facility] [phone].    We comply with applicable federal civil rights laws and [place] laws. We do not discriminate on the basis of race, color, national origin, age, disability, sex, sexual orientation, or gender identity.            Thank you!     Thank you for choosing [facility]  for your care. Our goal is always to provide you with excellent care. Hearing back from our patients is one way we can continue to improve our services. Please take a few minutes to complete the written survey that you may receive in the mail after your visit with us. Thank you!             Your Updated Medication List - Protect others around you: Learn how to safely use, store and throw away your medicines at www.disposemymeds.org.          This list is accurate as of 4/23/18  8:29 AM.  Always use your most recent med list.                   Brand Name Dispense Instructions for use Diagnosis    atorvastatin 20 MG tablet    LIPITOR    90 tablet    Take 1 tablet (20 mg) by mouth daily    Hyperlipidemia LDL goal <160       CALCIUM CITRATE      daily        CO Q 10 PO      Take  by mouth.        DAILY MULTIVITAMIN PO      Take  by mouth.        FIBER-CAPS PO           losartan 50 MG tablet    COZAAR    90 tablet    Take 1 tablet (50 mg) by mouth daily     Hypertension goal BP (blood pressure) < 140/90       VITAMIN D-3 PO      Take  by mouth.

## 2019-04-09 DIAGNOSIS — I10 HYPERTENSION GOAL BP (BLOOD PRESSURE) < 140/90: ICD-10-CM

## 2019-04-09 DIAGNOSIS — E78.5 HYPERLIPIDEMIA LDL GOAL <160: ICD-10-CM

## 2019-04-10 RX ORDER — ATORVASTATIN CALCIUM 20 MG/1
TABLET, FILM COATED ORAL
Qty: 90 TABLET | Refills: 0 | Status: SHIPPED | OUTPATIENT
Start: 2019-04-10 | End: 2019-07-10

## 2019-04-10 RX ORDER — LOSARTAN POTASSIUM 50 MG/1
TABLET ORAL
Qty: 90 TABLET | Refills: 0 | Status: SHIPPED | OUTPATIENT
Start: 2019-04-10 | End: 2019-07-10

## 2019-07-10 DIAGNOSIS — I10 HYPERTENSION GOAL BP (BLOOD PRESSURE) < 140/90: ICD-10-CM

## 2019-07-10 DIAGNOSIS — E78.5 HYPERLIPIDEMIA LDL GOAL <160: ICD-10-CM

## 2019-07-12 NOTE — TELEPHONE ENCOUNTER
Patient is due for appointment. Last seen 4/23/18. Please call and schedule.    Cynthia Riggins RN

## 2019-07-15 RX ORDER — ATORVASTATIN CALCIUM 20 MG/1
TABLET, FILM COATED ORAL
Qty: 90 TABLET | Refills: 0 | Status: SHIPPED | OUTPATIENT
Start: 2019-07-15 | End: 2019-07-29

## 2019-07-15 RX ORDER — LOSARTAN POTASSIUM 50 MG/1
TABLET ORAL
Qty: 90 TABLET | Refills: 0 | Status: SHIPPED | OUTPATIENT
Start: 2019-07-15 | End: 2019-07-29

## 2019-07-15 NOTE — TELEPHONE ENCOUNTER
Medication is being filled for 1 time refill only due to:  Patient needs to be seen because needs appt.   Pt is scheduled.  Cierra Moon RN

## 2019-07-28 ASSESSMENT — ENCOUNTER SYMPTOMS
PARESTHESIAS: 0
SORE THROAT: 0
HEADACHES: 0
WEAKNESS: 0
ARTHRALGIAS: 0
NERVOUS/ANXIOUS: 0
FEVER: 0
ABDOMINAL PAIN: 0
EYE PAIN: 0
MYALGIAS: 0
NAUSEA: 0
JOINT SWELLING: 0
HEARTBURN: 0
SHORTNESS OF BREATH: 0
CONSTIPATION: 0
HEMATOCHEZIA: 0
HEMATURIA: 0
PALPITATIONS: 0
CHILLS: 0
DYSURIA: 0
DIZZINESS: 0
DIARRHEA: 0
FREQUENCY: 0
COUGH: 0

## 2019-07-29 ENCOUNTER — OFFICE VISIT (OUTPATIENT)
Dept: FAMILY MEDICINE | Facility: CLINIC | Age: 59
End: 2019-07-29
Payer: COMMERCIAL

## 2019-07-29 VITALS
HEART RATE: 83 BPM | WEIGHT: 215 LBS | BODY MASS INDEX: 30.1 KG/M2 | HEIGHT: 71 IN | TEMPERATURE: 98.2 F | SYSTOLIC BLOOD PRESSURE: 122 MMHG | DIASTOLIC BLOOD PRESSURE: 80 MMHG | OXYGEN SATURATION: 96 %

## 2019-07-29 DIAGNOSIS — Z12.5 PROSTATE CANCER SCREENING: ICD-10-CM

## 2019-07-29 DIAGNOSIS — Z00.00 ROUTINE GENERAL MEDICAL EXAMINATION AT A HEALTH CARE FACILITY: Primary | ICD-10-CM

## 2019-07-29 DIAGNOSIS — I10 HTN, GOAL BELOW 140/90: ICD-10-CM

## 2019-07-29 DIAGNOSIS — E78.5 HYPERLIPIDEMIA LDL GOAL <100: ICD-10-CM

## 2019-07-29 LAB
ALT SERPL W P-5'-P-CCNC: 38 U/L (ref 0–70)
ANION GAP SERPL CALCULATED.3IONS-SCNC: 3 MMOL/L (ref 3–14)
BUN SERPL-MCNC: 19 MG/DL (ref 7–30)
CALCIUM SERPL-MCNC: 9.2 MG/DL (ref 8.5–10.1)
CHLORIDE SERPL-SCNC: 105 MMOL/L (ref 94–109)
CHOLEST SERPL-MCNC: 169 MG/DL
CO2 SERPL-SCNC: 29 MMOL/L (ref 20–32)
CREAT SERPL-MCNC: 0.99 MG/DL (ref 0.66–1.25)
GFR SERPL CREATININE-BSD FRML MDRD: 83 ML/MIN/{1.73_M2}
GLUCOSE SERPL-MCNC: 100 MG/DL (ref 70–99)
HDLC SERPL-MCNC: 50 MG/DL
LDLC SERPL CALC-MCNC: 91 MG/DL
NONHDLC SERPL-MCNC: 119 MG/DL
POTASSIUM SERPL-SCNC: 4.1 MMOL/L (ref 3.4–5.3)
PSA SERPL-ACNC: 0.49 UG/L (ref 0–4)
SODIUM SERPL-SCNC: 137 MMOL/L (ref 133–144)
TRIGL SERPL-MCNC: 139 MG/DL

## 2019-07-29 PROCEDURE — 99213 OFFICE O/P EST LOW 20 MIN: CPT | Mod: 25 | Performed by: FAMILY MEDICINE

## 2019-07-29 PROCEDURE — 36415 COLL VENOUS BLD VENIPUNCTURE: CPT | Performed by: FAMILY MEDICINE

## 2019-07-29 PROCEDURE — 80061 LIPID PANEL: CPT | Performed by: FAMILY MEDICINE

## 2019-07-29 PROCEDURE — 84460 ALANINE AMINO (ALT) (SGPT): CPT | Performed by: FAMILY MEDICINE

## 2019-07-29 PROCEDURE — G0103 PSA SCREENING: HCPCS | Performed by: FAMILY MEDICINE

## 2019-07-29 PROCEDURE — 99396 PREV VISIT EST AGE 40-64: CPT | Performed by: FAMILY MEDICINE

## 2019-07-29 PROCEDURE — 80048 BASIC METABOLIC PNL TOTAL CA: CPT | Performed by: FAMILY MEDICINE

## 2019-07-29 RX ORDER — LOSARTAN POTASSIUM 50 MG/1
50 TABLET ORAL DAILY
Qty: 90 TABLET | Refills: 3 | Status: SHIPPED | OUTPATIENT
Start: 2019-07-29 | End: 2020-10-14

## 2019-07-29 RX ORDER — ATORVASTATIN CALCIUM 20 MG/1
20 TABLET, FILM COATED ORAL DAILY
Qty: 90 TABLET | Refills: 3 | Status: SHIPPED | OUTPATIENT
Start: 2019-07-29 | End: 2020-10-14

## 2019-07-29 ASSESSMENT — ENCOUNTER SYMPTOMS
WEAKNESS: 0
HEMATURIA: 0
NAUSEA: 0
CHILLS: 0
DIARRHEA: 0
HEADACHES: 0
NERVOUS/ANXIOUS: 0
SHORTNESS OF BREATH: 0
FEVER: 0
HEMATOCHEZIA: 0
DYSURIA: 0
ABDOMINAL PAIN: 0
SORE THROAT: 0
COUGH: 0
JOINT SWELLING: 0
CONSTIPATION: 0
ARTHRALGIAS: 0
PARESTHESIAS: 0
HEARTBURN: 0
DIZZINESS: 0
EYE PAIN: 0
FREQUENCY: 0
MYALGIAS: 0
PALPITATIONS: 0

## 2019-07-29 ASSESSMENT — MIFFLIN-ST. JEOR: SCORE: 1816.32

## 2019-07-29 NOTE — PROGRESS NOTES
"  SUBJECTIVE:   CC: Audi Aguilar is an 59 year old male who presents for preventive health visit.     Healthy Habits:    Do you get at least three servings of calcium containing foods daily (dairy, green leafy vegetables, etc.)? { :524821::\"yes\"}    Amount of exercise or daily activities, outside of work: { :016310}    Problems taking medications regularly { :576230::\"No\"}    Medication side effects: { :102916::\"No\"}    Have you had an eye exam in the past two years? { :480303}    Do you see a dentist twice per year? { :112700}    Do you have sleep apnea, excessive snoring or daytime drowsiness?{ :752942}  {Outside tests to abstract? :296730}    {additional problems to add (Optional):461284}    Today's PHQ-2 Score:   PHQ-2 ( 1999 Pf[employer]) 7/28/2019 4/23/2018   Q1: Little interest or pleasure in doing things 0 0   Q2: Feeling down, depressed or hopeless 0 0   PHQ-2 Score 0 0   Q1: Little interest or pleasure in doing things Not at all -   Q2: Feeling down, depressed or hopeless Not at all -   PHQ-2 Score 0 -     {PHQ-2 LOOK IN ASSESSMENTS (Optional) :654168}  Abuse: Current or Past(Physical, Sexual or Emotional)- {YES/NO/NA:494620}  Do you feel safe in your environment? {YES/NO/NA:154827}    Social History     Tobacco Use     Smoking status: Never Smoker     Smokeless tobacco: Never Used   Substance Use Topics     Alcohol use: Yes     Alcohol/week: 0.0 oz     Comment: < 3 drinks a week     If you drink alcohol do you typically have >3 drinks per day or >7 drinks per week? {ETOH :417662}                      Last PSA: No results found for: PSA    Reviewed orders with patient. Reviewed health maintenance and updated orders accordingly - {Yes/No:197716::\"Yes\"}  {Chronicprobdata (Optional):566466}    Reviewed and updated as needed this visit by clinical staff         Reviewed and updated as needed this visit by Provider        {HISTORY OPTIONS (Optional):118488}    ROS:  { :133517::\"CONSTITUTIONAL: NEGATIVE for fever, " "chills, change in weight\",\"INTEGUMENTARY/SKIN: NEGATIVE for worrisome rashes, moles or lesions\",\"EYES: NEGATIVE for vision changes or irritation\",\"ENT: NEGATIVE for ear, mouth and throat problems\",\"RESP: NEGATIVE for significant cough or SOB\",\"CV: NEGATIVE for chest pain, palpitations or peripheral edema\",\"GI: NEGATIVE for nausea, abdominal pain, heartburn, or change in bowel habits\",\" male: negative for dysuria, hematuria, decreased urinary stream, erectile dysfunction, urethral discharge\",\"MUSCULOSKELETAL: NEGATIVE for significant arthralgias or myalgia\",\"NEURO: NEGATIVE for weakness, dizziness or paresthesias\",\"PSYCHIATRIC: NEGATIVE for changes in mood or affect\"}    OBJECTIVE:   There were no vitals taken for this visit.  EXAM:  {Exam Choices:152618}    {Diagnostic Test Results (Optional):602525::\"Diagnostic Test Results:\",\"Labs reviewed in Epic\"}    ASSESSMENT/PLAN:   {Diag Picklist:264861}    COUNSELING:  {MALE COUNSELING MESSAGES:085705::\"Reviewed preventive health counseling, as reflected in patient instructions\"}    Estimated body mass index is 28.31 kg/m  as calculated from the following:    Height as of 4/23/18: 1.803 m (5' 11\").    Weight as of 4/23/18: 92.1 kg (203 lb).    {Weight Management Plan (ACO) Complete if BMI is abnormal-  Ages 18-64  BMI >24.9.  Age 65+ with BMI <23 or >30 (Optional):757247}     reports that he has never smoked. He has never used smokeless tobacco.  {Tobacco Cessation -- Complete if patient is a smoker (Optional):547639}    Counseling Resources:  ATP IV Guidelines  Pooled Cohorts Equation Calculator  FRAX Risk Assessment  ICSI Preventive Guidelines  Dietary Guidelines for Americans, 2010  USDA's MyPlate  ASA Prophylaxis  Lung CA Screening    Chauncey Mcduffie MD  [facility]  "

## 2019-07-29 NOTE — PATIENT INSTRUCTIONS
Preventive Health Recommendations  Male Ages 50 - 64    Yearly exam:             See your health care provider every year in order to  o   Review health changes.   o   Discuss preventive care.    o   Review your medicines if your doctor has prescribed any.     Have a cholesterol test every 5 years, or more frequently if you are at risk for high cholesterol/heart disease.     Have a diabetes test (fasting glucose) every three years. If you are at risk for diabetes, you should have this test more often.     Have a colonoscopy at age 50, or have a yearly FIT test (stool test). These exams will check for colon cancer.      Talk with your health care provider about whether or not a prostate cancer screening test (PSA) is right for you.    You should be tested each year for STDs (sexually transmitted diseases), if you re at risk.     Shots: Get a flu shot each year. Get a tetanus shot every 10 years.     Nutrition:    Eat at least 5 servings of fruits and vegetables daily.     Eat whole-grain bread, whole-wheat pasta and brown rice instead of white grains and rice.     Get adequate Calcium and Vitamin D.     Lifestyle    Exercise for at least 150 minutes a week (30 minutes a day, 5 days a week). This will help you control your weight and prevent disease.     Limit alcohol to one drink per day.     No smoking.     Wear sunscreen to prevent skin cancer.     See your dentist every six months for an exam and cleaning.     See your eye doctor every 1 to 2 years.    Preventive Health Recommendations  Male Ages 50 - 64    Yearly exam:             See your health care provider every year in order to  o   Review health changes.   o   Discuss preventive care.    o   Review your medicines if your doctor has prescribed any.     Have a cholesterol test every 5 years, or more frequently if you are at risk for high cholesterol/heart disease.     Have a diabetes test (fasting glucose) every three years. If you are at risk for diabetes,  you should have this test more often.     Have a colonoscopy at age 50, or have a yearly FIT test (stool test). These exams will check for colon cancer.      Talk with your health care provider about whether or not a prostate cancer screening test (PSA) is right for you.    You should be tested each year for STDs (sexually transmitted diseases), if you re at risk.     Shots: Get a flu shot each year. Get a tetanus shot every 10 years.     Nutrition:    Eat at least 5 servings of fruits and vegetables daily.     Eat whole-grain bread, whole-wheat pasta and brown rice instead of white grains and rice.     Get adequate Calcium and Vitamin D.     Lifestyle    Exercise for at least 150 minutes a week (30 minutes a day, 5 days a week). This will help you control your weight and prevent disease.     Limit alcohol to one drink per day.     No smoking.     Wear sunscreen to prevent skin cancer.     See your dentist every six months for an exam and cleaning.     See your eye doctor every 1 to 2 years.    Preventive Health Recommendations  Male Ages 50 - 64    Yearly exam:             See your health care provider every year in order to  o   Review health changes.   o   Discuss preventive care.    o   Review your medicines if your doctor has prescribed any.     Have a cholesterol test every 5 years, or more frequently if you are at risk for high cholesterol/heart disease.     Have a diabetes test (fasting glucose) every three years. If you are at risk for diabetes, you should have this test more often.     Have a colonoscopy at age 50, or have a yearly FIT test (stool test). These exams will check for colon cancer.      Talk with your health care provider about whether or not a prostate cancer screening test (PSA) is right for you.    You should be tested each year for STDs (sexually transmitted diseases), if you re at risk.     Shots: Get a flu shot each year. Get a tetanus shot every 10 years.     Nutrition:    Eat at least  5 servings of fruits and vegetables daily.     Eat whole-grain bread, whole-wheat pasta and brown rice instead of white grains and rice.     Get adequate Calcium and Vitamin D.     Lifestyle    Exercise for at least 150 minutes a week (30 minutes a day, 5 days a week). This will help you control your weight and prevent disease.     Limit alcohol to one drink per day.     No smoking.     Wear sunscreen to prevent skin cancer.     See your dentist every six months for an exam and cleaning.     See your eye doctor every 1 to 2 years.    Preventive Health Recommendations  Male Ages 50 - 64    Yearly exam:             See your health care provider every year in order to  o   Review health changes.   o   Discuss preventive care.    o   Review your medicines if your doctor has prescribed any.     Have a cholesterol test every 5 years, or more frequently if you are at risk for high cholesterol/heart disease.     Have a diabetes test (fasting glucose) every three years. If you are at risk for diabetes, you should have this test more often.     Have a colonoscopy at age 50, or have a yearly FIT test (stool test). These exams will check for colon cancer.      Talk with your health care provider about whether or not a prostate cancer screening test (PSA) is right for you.    You should be tested each year for STDs (sexually transmitted diseases), if you re at risk.     Shots: Get a flu shot each year. Get a tetanus shot every 10 years.     Nutrition:    Eat at least 5 servings of fruits and vegetables daily.     Eat whole-grain bread, whole-wheat pasta and brown rice instead of white grains and rice.     Get adequate Calcium and Vitamin D.     Lifestyle    Exercise for at least 150 minutes a week (30 minutes a day, 5 days a week). This will help you control your weight and prevent disease.     Limit alcohol to one drink per day.     No smoking.     Wear sunscreen to prevent skin cancer.     See your dentist every six months for  an exam and cleaning.     See your eye doctor every 1 to 2 years.

## 2019-09-29 ENCOUNTER — HEALTH MAINTENANCE LETTER (OUTPATIENT)
Age: 59
End: 2019-09-29

## 2020-10-13 DIAGNOSIS — I10 HTN, GOAL BELOW 140/90: ICD-10-CM

## 2020-10-13 DIAGNOSIS — E78.5 HYPERLIPIDEMIA LDL GOAL <100: ICD-10-CM

## 2020-10-13 NOTE — LETTER
October 15, 2020      Audi Aguilar  [address] [place] [address]            Your provider has sent a 90 day lucía refill of atorvastatin (LIPITOR) 20 MG tablet and losartan (COZAAR) 50 MG tablet. You are due for an appointment for further refills.  Please contact the clinic to schedule an appointment for further refills.      Sincerely,       [facility]

## 2020-10-14 RX ORDER — ATORVASTATIN CALCIUM 20 MG/1
TABLET, FILM COATED ORAL
Qty: 90 TABLET | Refills: 0 | Status: SHIPPED | OUTPATIENT
Start: 2020-10-14 | End: 2021-01-11

## 2020-10-14 RX ORDER — LOSARTAN POTASSIUM 50 MG/1
TABLET ORAL
Qty: 90 TABLET | Refills: 0 | Status: SHIPPED | OUTPATIENT
Start: 2020-10-14 | End: 2021-01-11

## 2020-10-14 NOTE — TELEPHONE ENCOUNTER
Routing refill request to provider for review/approval because:  Labs not current:    Potassium   Date Value Ref Range Status   07/29/2019 4.1 3.4 - 5.3 mmol/L Final     Creatinine   Date Value Ref Range Status   07/29/2019 0.99 0.66 - 1.25 mg/dL Final         Patient needs to be seen because it has been more than 1 year since last office visit.  BP Readings from Last 6 Encounters:   07/29/19 122/80   04/23/18 126/82   04/10/17 114/74   03/23/17 104/74   03/14/17 118/70   04/13/16 118/78     Elissa Saravia RN

## 2020-10-19 DIAGNOSIS — Z12.5 PROSTATE CANCER SCREENING: ICD-10-CM

## 2020-10-19 DIAGNOSIS — Z00.00 HEALTHCARE MAINTENANCE: ICD-10-CM

## 2020-10-19 LAB
ALT SERPL W P-5'-P-CCNC: 28 U/L (ref 0–70)
ANION GAP SERPL CALCULATED.3IONS-SCNC: 7 MMOL/L (ref 3–14)
BUN SERPL-MCNC: 15 MG/DL (ref 7–30)
CALCIUM SERPL-MCNC: 9.7 MG/DL (ref 8.5–10.1)
CHLORIDE SERPL-SCNC: 103 MMOL/L (ref 94–109)
CHOLEST SERPL-MCNC: 182 MG/DL
CO2 SERPL-SCNC: 27 MMOL/L (ref 20–32)
CREAT SERPL-MCNC: 1.04 MG/DL (ref 0.66–1.25)
GFR SERPL CREATININE-BSD FRML MDRD: 78 ML/MIN/{1.73_M2}
GLUCOSE SERPL-MCNC: 98 MG/DL (ref 70–99)
HDLC SERPL-MCNC: 54 MG/DL
LDLC SERPL CALC-MCNC: 103 MG/DL
NONHDLC SERPL-MCNC: 128 MG/DL
POTASSIUM SERPL-SCNC: 4.4 MMOL/L (ref 3.4–5.3)
PSA SERPL-ACNC: 0.55 UG/L (ref 0–4)
SODIUM SERPL-SCNC: 137 MMOL/L (ref 133–144)
TRIGL SERPL-MCNC: 124 MG/DL

## 2020-10-19 PROCEDURE — G0103 PSA SCREENING: HCPCS | Performed by: FAMILY MEDICINE

## 2020-10-19 PROCEDURE — 36415 COLL VENOUS BLD VENIPUNCTURE: CPT | Performed by: FAMILY MEDICINE

## 2020-10-19 PROCEDURE — 80048 BASIC METABOLIC PNL TOTAL CA: CPT | Performed by: FAMILY MEDICINE

## 2020-10-19 PROCEDURE — 84460 ALANINE AMINO (ALT) (SGPT): CPT | Performed by: FAMILY MEDICINE

## 2020-10-19 PROCEDURE — 80061 LIPID PANEL: CPT | Performed by: FAMILY MEDICINE

## 2020-10-21 NOTE — PROGRESS NOTES
"Audi Aguilar is a 60 year old male who is being evaluated via a billable telephone visit.      The patient has been notified of following:     \"This telephone visit will be conducted via a call between you and your physician/provider. We have found that certain health care needs can be provided without the need for a physical exam.  This service lets us provide the care you need with a short phone conversation.  If a prescription is necessary we can send it directly to your pharmacy.  If lab work is needed we can place an order for that and you can then stop by our lab to have the test done at a later time.    Telephone visits are billed at different rates depending on your insurance coverage. During this emergency period, for some insurers they may be billed the same as an in-person visit.  Please reach out to your insurance provider with any questions.    If during the course of the call the physician/provider feels a telephone visit is not appropriate, you will not be charged for this service.\"    Patient has given verbal consent for Telephone visit?  Yes    What phone number would you like to be contacted at? [phone]    How would you like to obtain your AVS? Natalia Silverio     Audi Aguilar is a 60 year old male who presents via phone visit today for the following health issues:    HPI     Hyperlipidemia Follow-Up      Are you regularly taking any medication or supplement to lower your cholesterol?   Yes- atorvastatin    Are you having muscle aches or other side effects that you think could be caused by your cholesterol lowering medication?  No    Hypertension Follow-up      Do you check your blood pressure regularly outside of the clinic? No     Are you following a low salt diet? Yes    Are your blood pressures ever more than 140 on the top number (systolic) OR more   than 90 on the bottom number (diastolic), for example 140/90? Yes      How many servings of fruits and vegetables do you eat daily?  " 2-3    On average, how many sweetened beverages do you drink each day (Examples: soda, juice, sweet tea, etc.  Do NOT count diet or artificially sweetened beverages)?   0    How many days per week do you exercise enough to make your heart beat faster? 3 or less    How many minutes a day do you exercise enough to make your heart beat faster? 30 - 60    How many days per week do you miss taking your medication? 0    Review of Systems   Constitutional, HEENT, cardiovascular, pulmonary, gi and gu systems are negative, except as otherwise noted.       Objective          Vitals:  No vitals were obtained today due to virtual visit.    healthy, alert and no distress  PSYCH: Alert, coherent speech, normal    His affect is normal.      Contains abnormal data Lipid panel reflex to direct LDL Fasting  Order: [id number]  Status:  Final result   Visible to patient:  Yes (Eniram) Dx:  Healthcare maintenance    Ref Range & Units 4d ago    Cholesterol <200 mg/dL 182     Triglycerides <150 mg/dL 124    Comment: Fasting specimen    HDL Cholesterol >39 mg/dL 54     LDL Cholesterol Calculated <100 mg/dL 103High     Comment: Above desirable:  100-129 mg/dl   Borderline High:  130-159 mg/dL   High:             160-189 mg/dL   Very high:       >189 mg/dl     Non HDL Cholesterol <130 mg/dL 128    Resulting Agency  [facility]         Specimen Collected: 10/19/20  1:38 PM Last Resulted: 10/19/20  8:25 PM              **Basic metabolic panel FUTURE anytime  Order: [id number]  Status:  Final result   Visible to patient:  Yes (Eniram) Dx:  Healthcare maintenance    Ref Range & Units 4d ago    Sodium 133 - 144 mmol/L 137     Potassium 3.4 - 5.3 mmol/L 4.4     Chloride 94 - 109 mmol/L 103     Carbon Dioxide 20 - 32 mmol/L 27     Anion Gap 3 - 14 mmol/L 7     Glucose 70 - 99 mg/dL 98    Comment: Fasting specimen    Urea Nitrogen 7 - 30 mg/dL 15     Creatinine 0.66 - 1.25 mg/dL 1.04     GFR Estimate >60 mL/min/ 78    Comment: Non  GFR Calc    Starting 12/18/2018, serum creatinine based estimated GFR (eGFR) will be   calculated using the Chronic Kidney Disease Epidemiology Collaboration   (CKD-EPI) equation.     GFR Estimate If Black >60 mL/min/ 90    Comment:  GFR Calc   Starting 12/18/2018, serum creatinine based estimated GFR (eGFR) will be   calculated using the Chronic Kidney Disease Epidemiology Collaboration   (CKD-EPI) equation.     Calcium 8.5 - 10.1 mg/dL 9.7            **ALT FUTURE anytime  Order: [id number]  Status:  Final result   Visible to patient:  Yes (MyChart) Dx:  Healthcare maintenance    Ref Range & Units 4d ago    ALT 0 - 70 U/L 28    Resulting Agency  [facility]         Specimen Collected: 10/19/20  1:38 PM Last Resulted: 10/19/20  8:23 PM           Prostate spec antigen screen FUTURE anytime  Order: [id number]  Status:  Final result   Visible to patient:  Yes (MyChart) Dx:  Prostate cancer screening    Ref Range & Units 4d ago    PSA 0 - 4 ug/L 0.55    Comment: Assay Method:  Chemiluminescence using Siemens Vista analyzer   Resulting Agency  [facility]         Specimen Collected: 10/19/20  1:38 PM Last Resulted: 10/19/20  8:30 PM           Colonoscopy: 6/3/2011    - Repeat colonoscopy in 10 years for screening                             purposes.   Meds:   Lipitor, Losartan, supplements    Assessment & Plan     Audi was seen today for hypertension.    Diagnoses and all orders for this visit:  Return in about 6 months (around 4/23/2021) for Routine Visit.    Chauncey Mcduffie MD  [facility]    Phone call duration: 16 minutes    Could not connect will reschedule a video visit

## 2020-10-23 ENCOUNTER — VIRTUAL VISIT (OUTPATIENT)
Dept: FAMILY MEDICINE | Facility: CLINIC | Age: 60
End: 2020-10-23
Payer: COMMERCIAL

## 2020-10-23 DIAGNOSIS — Z53.9 ERRONEOUS ENCOUNTER--DISREGARD: Primary | ICD-10-CM

## 2020-10-26 ASSESSMENT — ENCOUNTER SYMPTOMS
HEARTBURN: 0
CHILLS: 0
SHORTNESS OF BREATH: 0
SORE THROAT: 0
HEADACHES: 0
FREQUENCY: 0
HEMATURIA: 0
MYALGIAS: 0
JOINT SWELLING: 0
DIZZINESS: 0
ABDOMINAL PAIN: 0
NAUSEA: 0
FEVER: 0
WEAKNESS: 0
EYE PAIN: 0
HEMATOCHEZIA: 0
PALPITATIONS: 0
ARTHRALGIAS: 0
NERVOUS/ANXIOUS: 0
COUGH: 0
PARESTHESIAS: 0
DIARRHEA: 0
CONSTIPATION: 0
DYSURIA: 0

## 2020-10-27 ENCOUNTER — OFFICE VISIT (OUTPATIENT)
Dept: FAMILY MEDICINE | Facility: CLINIC | Age: 60
End: 2020-10-27
Payer: COMMERCIAL

## 2020-10-27 VITALS
DIASTOLIC BLOOD PRESSURE: 84 MMHG | OXYGEN SATURATION: 97 % | TEMPERATURE: 98.1 F | HEART RATE: 73 BPM | SYSTOLIC BLOOD PRESSURE: 122 MMHG | WEIGHT: 215 LBS | BODY MASS INDEX: 29.78 KG/M2

## 2020-10-27 DIAGNOSIS — Z00.00 ROUTINE HISTORY AND PHYSICAL EXAMINATION OF ADULT: Primary | ICD-10-CM

## 2020-10-27 DIAGNOSIS — E78.5 HYPERLIPIDEMIA LDL GOAL <100: ICD-10-CM

## 2020-10-27 DIAGNOSIS — I10 HTN, GOAL BELOW 140/90: ICD-10-CM

## 2020-10-27 PROCEDURE — 99396 PREV VISIT EST AGE 40-64: CPT | Performed by: FAMILY MEDICINE

## 2020-10-27 ASSESSMENT — ENCOUNTER SYMPTOMS
DIARRHEA: 0
PARESTHESIAS: 0
HEARTBURN: 0
ARTHRALGIAS: 0
WEAKNESS: 0
COUGH: 0
DYSURIA: 0
JOINT SWELLING: 0
NERVOUS/ANXIOUS: 0
EYE PAIN: 0
NAUSEA: 0
DIZZINESS: 0
HEMATURIA: 0
SORE THROAT: 0
HEMATOCHEZIA: 0
SHORTNESS OF BREATH: 0
HEADACHES: 0
CHILLS: 0
PALPITATIONS: 0
MYALGIAS: 0
ABDOMINAL PAIN: 0
FEVER: 0
FREQUENCY: 0
CONSTIPATION: 0

## 2020-10-27 NOTE — PROGRESS NOTES
SUBJECTIVE:   CC: Audi Aguilar is an 60 year old male who presents for preventative health visit.       Patient has been advised of split billing requirements and indicates understanding: Yes  Healthy Habits:     Getting at least 3 servings of Calcium per day:  Yes    Bi-annual eye exam:  Yes    Dental care twice a year:  Yes    Sleep apnea or symptoms of sleep apnea:  None    Diet:  Low salt and Carbohydrate counting    Frequency of exercise:  1 day/week    Duration of exercise:  Less than 15 minutes    Taking medications regularly:  Yes    Medication side effects:  None    PHQ-2 Total Score: 0    Additional concerns today:  No      Changes:   1. Exercising less; involved in writing and taking most of his time.    Today's PHQ-2 Score:   PHQ-2 ( 1999 Pfizer) 10/26/2020   Q1: Little interest or pleasure in doing things 0   Q2: Feeling down, depressed or hopeless 0   PHQ-2 Score 0   Q1: Little interest or pleasure in doing things Not at all   Q2: Feeling down, depressed or hopeless Not at all   PHQ-2 Score 0       Abuse: Current or Past(Physical, Sexual or Emotional)- NO  Do you feel safe in your environment? Yes    Have you ever done Advance Care Planning? (For example, a Health Directive, POLST, or a discussion with a medical provider or your loved ones about your wishes): Yes, advance care planning is on file.    Social History     Tobacco Use     Smoking status: Never Smoker     Smokeless tobacco: Never Used   Substance Use Topics     Alcohol use: Yes     Alcohol/week: 0.0 standard drinks     Comment: < 3 drinks a week     If you drink alcohol do you typically have >3 drinks per day or >7 drinks per week? No    No flowsheet data found.    Last PSA:   PSA   Date Value Ref Range Status   10/19/2020 0.55 0 - 4 ug/L Final     Comment:     Assay Method:  Chemiluminescence using Siemens Vista analyzer       Reviewed orders with patient. Reviewed health maintenance and updated orders accordingly - Yes  Patient Active  Problem List   Diagnosis     Hyperlipidemia LDL goal <160     Hypertension goal BP (blood pressure) < 140/90     Left varicocele     Past Surgical History:   Procedure Laterality Date     ENT SURGERY  1970ish    Tonsils and adnoids out     GENITOURINARY SURGERY  1995ish    Vasectomy.     ZZC NONSPECIFIC PROCEDURE  1995    Vasectomy       Social History     Tobacco Use     Smoking status: Never Smoker     Smokeless tobacco: Never Used   Substance Use Topics     Alcohol use: Yes     Alcohol/week: 0.0 standard drinks     Comment: < 3 drinks a week     Family History   Problem Relation Age of Onset     Lipids Father      Hypertension Father      Hyperlipidemia Father      C.A.D. Paternal Grandmother      C.A.D. Paternal Grandfather      Thyroid Disease Mother            Reviewed and updated as needed this visit by Provider                 Review of Systems   Constitutional: Negative for chills and fever.   HENT: Negative for congestion, ear pain, hearing loss and sore throat.    Eyes: Negative for pain and visual disturbance.   Respiratory: Negative for cough and shortness of breath.    Cardiovascular: Negative for chest pain, palpitations and peripheral edema.   Gastrointestinal: Negative for abdominal pain, constipation, diarrhea, heartburn, hematochezia and nausea.   Genitourinary: Negative for discharge, dysuria, frequency, genital sores, hematuria, impotence and urgency.   Musculoskeletal: Negative for arthralgias, joint swelling and myalgias.   Skin: Negative for rash.   Neurological: Negative for dizziness, weakness, headaches and paresthesias.   Psychiatric/Behavioral: Negative for mood changes. The patient is not nervous/anxious.      OBJECTIVE:   /84   Pulse 73   Temp 98.1  F (36.7  C) (Oral)   Wt 97.5 kg (215 lb)   SpO2 97%   BMI 29.78 kg/m      Physical Exam  GENERAL: healthy, alert and no distress  EYES: Eyes grossly normal to inspection, PERRL and conjunctivae and sclerae normal  HENT: ear  "canals and TM's normal, nose and mouth without ulcers or lesions  NECK: no adenopathy and thyroid normal to palpation  RESP: lungs clear to auscultation - no rales, rhonchi or wheezes  CV: regular rate and rhythm, normal S1 S2, no S3 or S4, no murmur, click or rub, no peripheral edema   ABDOMEN: soft, nontender, no masses and bowel sounds normal  MS: no gross musculoskeletal defects noted, no edema  SKIN: no suspicious lesions or rashes  NEURO: Normal strength and tone, mentation intact and speech normal  PSYCH: mentation appears normal, affect normal/bright    Diagnostic Test Results:  Labs reviewed in Epic    ASSESSMENT/PLAN:   Audi was seen today for physical.    Diagnoses and all orders for this visit:    Routine history and physical examination of adult    HTN, goal below 140/90    Hyperlipidemia LDL goal <100        Patient has been advised of split billing requirements and indicates understanding: Yes  COUNSELING:   Reviewed preventive health counseling, as reflected in patient instructions       Regular exercise       Healthy diet/nutrition       Immunizations    Vaccinated for: Zoster : Will check with insurance first         Prostate cancer screening    Estimated body mass index is 29.78 kg/m  as calculated from the following:    Height as of 7/29/19: 1.81 m (5' 11.25\").    Weight as of this encounter: 97.5 kg (215 lb).     Weight management plan: Discussed healthy diet and exercise guidelines    He reports that he has never smoked. He has never used smokeless tobacco.      Counseling Resources:  ATP IV Guidelines  Pooled Cohorts Equation Calculator  FRAX Risk Assessment  ICSI Preventive Guidelines  Dietary Guidelines for Americans, 2010  USDA's MyPlate  ASA Prophylaxis  Lung CA Screening    Chauncey Mcduffie MD  [facility]  "

## 2020-11-05 NOTE — PROGRESS NOTES
Appointment cancelled.     From: Chauncey Mcduffie MD   Sent: 11/5/2020   7:49 AM CST   To: Fz Team Red   Subject: Video Visit cancel                               PLease cancel visit; had technical difficulties and could not complete     Chauncey Mcduffie MD

## 2021-01-08 DIAGNOSIS — E78.5 HYPERLIPIDEMIA LDL GOAL <100: ICD-10-CM

## 2021-01-08 DIAGNOSIS — I10 HTN, GOAL BELOW 140/90: ICD-10-CM

## 2021-01-11 RX ORDER — ATORVASTATIN CALCIUM 20 MG/1
TABLET, FILM COATED ORAL
Qty: 90 TABLET | Refills: 2 | Status: SHIPPED | OUTPATIENT
Start: 2021-01-11 | End: 2021-12-15

## 2021-01-11 RX ORDER — LOSARTAN POTASSIUM 50 MG/1
TABLET ORAL
Qty: 90 TABLET | Refills: 2 | Status: SHIPPED | OUTPATIENT
Start: 2021-01-11 | End: 2021-12-15

## 2021-06-15 ENCOUNTER — E-VISIT (OUTPATIENT)
Dept: URGENT CARE | Facility: CLINIC | Age: 61
End: 2021-06-15
Payer: COMMERCIAL

## 2021-06-15 DIAGNOSIS — J20.8 ACUTE BACTERIAL BRONCHITIS: Primary | ICD-10-CM

## 2021-06-15 DIAGNOSIS — B96.89 ACUTE BACTERIAL BRONCHITIS: Primary | ICD-10-CM

## 2021-06-15 PROCEDURE — 99421 OL DIG E/M SVC 5-10 MIN: CPT | Performed by: PHYSICIAN ASSISTANT

## 2021-06-15 RX ORDER — AZITHROMYCIN 250 MG/1
TABLET, FILM COATED ORAL
Qty: 6 TABLET | Refills: 0 | Status: SHIPPED | OUTPATIENT
Start: 2021-06-15 | End: 2021-06-20

## 2021-06-16 NOTE — PATIENT INSTRUCTIONS
"    Dear Audi Aguilar    After reviewing your responses, I've been able to diagnose you with \"Bronchitis\" which is a common infection of your lungs. While this is most commonly caused by a virus, the symptoms you have given suggest you should be treated with antibiotics.     I have sent a Zpack to your pharmacy to treat this infection.     It is important that you take all of your prescribed medication even if your symptoms are improving after a few doses. Taking all of your medicine helps prevent the symptoms from returning.     If your symptoms worsen, you develop chest pain or shortness of breath, fevers over 101, or are not improving in 5 days, please contact your primary care provider for an appointment or visit any of our convenient Walk-in Care or Urgent Care Centers to be seen which can be found on our website here.    Thanks again for choosing us as your health care partner,    Suzanne Crystal PA-C    "

## 2021-07-09 ENCOUNTER — VIRTUAL VISIT (OUTPATIENT)
Dept: FAMILY MEDICINE | Facility: CLINIC | Age: 61
End: 2021-07-09
Payer: COMMERCIAL

## 2021-07-09 DIAGNOSIS — Z79.899 ON ANGIOTENSIN RECEPTOR BLOCKERS (ARB): ICD-10-CM

## 2021-07-09 DIAGNOSIS — Z87.09 HISTORY OF ASTHMA: ICD-10-CM

## 2021-07-09 DIAGNOSIS — J98.01 COUGH DUE TO BRONCHOSPASM: Primary | ICD-10-CM

## 2021-07-09 PROCEDURE — 99213 OFFICE O/P EST LOW 20 MIN: CPT | Mod: 95 | Performed by: FAMILY MEDICINE

## 2021-07-09 RX ORDER — ALBUTEROL SULFATE 90 UG/1
2 AEROSOL, METERED RESPIRATORY (INHALATION) EVERY 6 HOURS PRN
Qty: 18 G | Refills: 1 | Status: SHIPPED | OUTPATIENT
Start: 2021-07-09 | End: 2021-07-09

## 2021-07-09 NOTE — PROGRESS NOTES
Audi is a 60 year old who is being evaluated via a billable video visit.      How would you like to obtain your AVS? MyChart  If the video visit is dropped, the invitation should be resent by: Text to cell phone: [phone]   Will anyone else be joining your video visit? No      Video Start Time: 3:12 PM    Assessment & Plan     Cough due to bronchospasm  Discussed differentials including, viral infection, asthma, allergies, acid reflux.  Given symptoms of cough on ongoing wheezing and a prior history of asthma, likelihood of asthma     History of asthma     Been in remission for many years, could be triggered by allergies.    On angiotensin receptor blockers (ARB)     -Medications could also be a cause and was considered but not high this time.    Return in about 1 week (around 7/16/2021) for Follow up for symptoms recheck.    Chauncey Mcduffie MD  [facility]    Subjective   Audi is a 60 year old who presents for the following health issues;     [facility]     ED/UC Followup:    Facility:  [facility]  Date of visit: 6/15/21  Reason for visit: Cough  Current Status: still has cough and wheezing     Treated for bronchitis   Still dry cough all day; worse in last few days  No SOB, sneezing occasional, has seasonal allergies tried some allergy pill but did.   There is been wheezing; constant low level wheezing.Had asthma when young; last attack about 40 yrs ago.    Review of Systems   Constitutional, HEENT, cardiovascular, gi and gu systems are negative, except as otherwise noted.      Objective           Vitals:  No vitals were obtained today due to virtual visit.        Video-Visit Details    Type of service:  Video Visit    Video End Time:3.25PM    Originating Location (pt. Location): Home    Distant Location (provider location):  [facility]     Platform used for Video Visit: Sequent

## 2021-10-04 ENCOUNTER — TELEPHONE (OUTPATIENT)
Dept: FAMILY MEDICINE | Facility: CLINIC | Age: 61
End: 2021-10-04

## 2021-10-04 NOTE — TELEPHONE ENCOUNTER
Patient Quality Outreach      Summary:    Patient has the following on his problem list/HM: None    Patient is due/failing the following:   Colonoscopy and Annual wellness, date due: due 10/27//2021    Type of outreach:    Sent letter.    Questions for provider review:    None                                                                                                                                     LS     Chart routed to none.

## 2021-10-04 NOTE — LETTER
October 4, 2021      Audi Aguilar  [address] [place] [address]      Your healthcare team cares about your health. To provide you with the best care,   we have reviewed your chart and based on our findings, we see that you are due to:     - COLON CANCER SCREENING:  Call or mychart the clinic to schedule your colonoscopy or schedule/ your FIT Test, or Cologuard test  - ANNUAL WELLNESS FOLLOW UP:   Schedule an Annual Medicare Wellness Exam. This can be done by in person visit or virtual video visit.     If you have already completed these items, please contact the clinic via phone or   Mychart so your care team can review and update your records. Thank you for   choosing [facility] for your healthcare needs. For any questions,   concerns, or to schedule an appointment please contact the clinic.       Healthy Regards,      Your [facility] Care Team

## 2021-10-24 ENCOUNTER — HEALTH MAINTENANCE LETTER (OUTPATIENT)
Age: 61
End: 2021-10-24

## 2021-10-31 NOTE — PROGRESS NOTES
"Outpatient/Observation goals to be met before discharge home:   /84   Pulse 76   Temp 98.4  F (36.9  C) (Oral)   Resp 17   Ht 1.651 m (5' 5\")   Wt 66.7 kg (147 lb)   SpO2 91%   BMI 24.46 kg/m      - Serial troponins and stress test complete.: Not met  - Seen and cleared by consultant if applicable: Not met  - Adequate pain control on oral analgesia: On-going  - Vital signs normal or at patient baseline: Met  - Safe disposition plan has been identified: Not met  " SUBJECTIVE:   CC: Audi Aguilar is an 59 year old male who presents for preventative health visit.     Healthy Habits:     Getting at least 3 servings of Calcium per day:  NO    Bi-annual eye exam:  Yes    Dental care twice a year:  Yes    Sleep apnea or symptoms of sleep apnea:  None    Diet:  Low fat/cholesterol    Frequency of exercise:  2-3 days/week    Duration of exercise:  15-30 minutes    Taking medications regularly:  Yes    Medication side effects:  None    PHQ-2 Total Score: 0    Additional concerns today:  No    Colonoscopy: 6/2021  Shingrix  PSA:   Med: Lip 20, Losartan 50  Hyperlipidemia Follow-Up      Are you having any of the following symptoms? (Select all that apply)  No complaints of shortness of breath, chest pain or pressure.  No increased sweating or nausea with activity.  No left-sided neck or arm pain.  No complaints of pain in calves when walking 1-2 blocks.    Are you regularly taking any medication or supplement to lower your cholesterol?   Yes- Lipitor 20 mg    Are you having muscle aches or other side effects that you think could be caused by your cholesterol lowering medication?  No    Hypertension Follow-up      Do you check your blood pressure regularly outside of the clinic? No     Are you following a low salt diet? Yes    Are your blood pressures ever more than 140 on the top number (systolic) OR more   than 90 on the bottom number (diastolic), for example 140/90? No    Weight:  Gained about 12 lb  Wt Readings from Last 4 Encounters:   07/29/19 97.5 kg (215 lb)   04/23/18 92.1 kg (203 lb)   04/10/17 91.6 kg (202 lb)   03/23/17 91.4 kg (201 lb 8 oz)     -------------------------------------    Today's PHQ-2 Score:   PHQ-2 ( 1999 Pfizer) 7/28/2019   Q1: Little interest or pleasure in doing things 0   Q2: Feeling down, depressed or hopeless 0   PHQ-2 Score 0   Q1: Little interest or pleasure in doing things Not at all   Q2: Feeling down, depressed or hopeless Not at all   PHQ-2 Score 0        Abuse: Current or Past(Physical, Sexual or Emotional)- NO  Do you feel safe in your environment? YES    Social History     Tobacco Use     Smoking status: Never Smoker     Smokeless tobacco: Never Used   Substance Use Topics     Alcohol use: Yes     Alcohol/week: 0.0 oz     Comment: < 3 drinks a week     No flowsheet data found.    Last PSA:   PSA   Date Value Ref Range Status   07/29/2019 0.49 0 - 4 ug/L Final     Comment:     Assay Method:  Chemiluminescence using Siemens Vista analyzer       Reviewed orders with patient. Reviewed health maintenance and updated orders accordingly - Yes  Patient Active Problem List   Diagnosis     Hyperlipidemia LDL goal <160     Hypertension goal BP (blood pressure) < 140/90     Left varicocele     Past Surgical History:   Procedure Laterality Date     C NONSPECIFIC PROCEDURE  1995    Vasectomy     ENT SURGERY  1970ish    Tonsils and adnoids out     GENITOURINARY SURGERY  1995ish    Vasectomy.       Social History     Tobacco Use     Smoking status: Never Smoker     Smokeless tobacco: Never Used   Substance Use Topics     Alcohol use: Yes     Alcohol/week: 0.0 oz     Comment: < 3 drinks a week     Family History   Problem Relation Age of Onset     Lipids Father      Hypertension Father      Hyperlipidemia Father      C.A.D. Paternal Grandmother      C.A.D. Paternal Grandfather      Thyroid Disease Mother        Tobacco  Allergies  Meds  Med Hx  Surg Hx  Fam Hx  Soc Hx      Reviewed and updated as needed this visit by Provider        Review of Systems   Constitutional: Negative for chills and fever.   HENT: Negative for congestion, ear pain, hearing loss and sore throat.    Eyes: Negative for pain and visual disturbance.   Respiratory: Negative for cough and shortness of breath.    Cardiovascular: Negative for chest pain, palpitations and peripheral edema.   Gastrointestinal: Negative for abdominal pain, constipation, diarrhea, heartburn, hematochezia and nausea.  "  Genitourinary: Negative for discharge, dysuria, frequency, genital sores, hematuria, impotence and urgency.   Musculoskeletal: Negative for arthralgias, joint swelling and myalgias.   Skin: Negative for rash.   Neurological: Negative for dizziness, weakness, headaches and paresthesias.   Psychiatric/Behavioral: Negative for mood changes. The patient is not nervous/anxious.        OBJECTIVE:   /80   Pulse 83   Temp 98.2  F (36.8  C) (Oral)   Ht 1.81 m (5' 11.25\")   Wt 97.5 kg (215 lb)   SpO2 96%   BMI 29.78 kg/m      Physical Exam  GENERAL: healthy, alert and no distress  EYES: Eyes grossly normal to inspection, PERRL and conjunctivae and sclerae normal  HENT: ear canals and TM's normal, nose and mouth without ulcers or lesions  NECK: no adenopathy and thyroid normal to palpation  RESP: lungs clear to auscultation - no rales, rhonchi or wheezes  CV: regular rate and rhythm, normal S1 S2, no S3 or S4, no murmur, click or rub, no peripheral edema   ABDOMEN: soft, nontender, no masses and bowel sounds normal  MS: no gross musculoskeletal defects noted, no edema  SKIN: no suspicious lesions or rashes  NEURO: Normal strength and tone, mentation intact and speech normal  PSYCH: mentation appears normal, affect normal/bright    Diagnostic Test Results:  Labs reviewed in Epic    ASSESSMENT/PLAN:   Audi was seen today for physical and health maintenance.    Diagnoses and all orders for this visit:    Routine general medical examination at a health care facility  -     Lipid Profile (Chol, Trig, HDL, LDL calc)  -     Basic metabolic panel  (Ca, Cl, CO2, Creat, Gluc, K, Na, BUN)    HTN, goal below 140/90  -     Basic metabolic panel  (Ca, Cl, CO2, Creat, Gluc, K, Na, BUN)  -     losartan (COZAAR) 50 MG tablet; Take 1 tablet (50 mg) by mouth daily    Hyperlipidemia LDL goal <100  -     ALT  -     atorvastatin (LIPITOR) 20 MG tablet; Take 1 tablet (20 mg) by mouth daily    Prostate cancer screening  -     PSA, " "screen    BMI 29.0-29.9,adult      COUNSELING:   Reviewed preventive health counseling, as reflected in patient instructions    Estimated body mass index is 29.78 kg/m  as calculated from the following:    Height as of this encounter: 1.81 m (5' 11.25\").    Weight as of this encounter: 97.5 kg (215 lb).     Weight management plan: Discussed healthy diet and exercise guidelines     reports that he has never smoked. He has never used smokeless tobacco.    Counseling Resources:  ATP IV Guidelines  Pooled Cohorts Equation Calculator  FRAX Risk Assessment  ICSI Preventive Guidelines  Dietary Guidelines for Americans, 2010  USDA's MyPlate  ASA Prophylaxis  Lung CA Screening    Chauncey Mcduffie MD  [facility]  "

## 2021-12-15 ENCOUNTER — TELEPHONE (OUTPATIENT)
Dept: FAMILY MEDICINE | Facility: CLINIC | Age: 61
End: 2021-12-15
Payer: COMMERCIAL

## 2021-12-15 DIAGNOSIS — E78.5 HYPERLIPIDEMIA LDL GOAL <100: ICD-10-CM

## 2021-12-15 DIAGNOSIS — I10 HTN, GOAL BELOW 140/90: ICD-10-CM

## 2021-12-15 RX ORDER — ATORVASTATIN CALCIUM 20 MG/1
20 TABLET, FILM COATED ORAL DAILY
Qty: 90 TABLET | Refills: 0 | Status: SHIPPED | OUTPATIENT
Start: 2021-12-15

## 2021-12-15 RX ORDER — LOSARTAN POTASSIUM 50 MG/1
50 TABLET ORAL DAILY
Qty: 90 TABLET | Refills: 0 | Status: SHIPPED | OUTPATIENT
Start: 2021-12-15

## 2021-12-15 NOTE — TELEPHONE ENCOUNTER
Audi Aguilar A  Patient Customer Service Request Pool 1 hour ago (8:53 AM)         Topic: Non-Medical Question.     I am changing my pharmacy to the [facility] at [address] [place] [address]. Please send my two current prescriptions to that pharmacy. Let me know if there is anything else I need to do. Thanks!  Audi

## 2021-12-19 ENCOUNTER — HEALTH MAINTENANCE LETTER (OUTPATIENT)
Age: 61
End: 2021-12-19

## 2022-10-15 ENCOUNTER — HEALTH MAINTENANCE LETTER (OUTPATIENT)
Age: 62
End: 2022-10-15

## 2023-02-21 ENCOUNTER — ALLIED HEALTH/NURSE VISIT (OUTPATIENT)
Dept: RESEARCH | Facility: CLINIC | Age: 63
End: 2023-02-21
Payer: COMMERCIAL

## 2023-02-21 VITALS
HEIGHT: 72 IN | OXYGEN SATURATION: 96 % | SYSTOLIC BLOOD PRESSURE: 102 MMHG | HEART RATE: 62 BPM | WEIGHT: 200 LBS | DIASTOLIC BLOOD PRESSURE: 76 MMHG | BODY MASS INDEX: 27.09 KG/M2 | RESPIRATION RATE: 16 BRPM

## 2023-02-21 DIAGNOSIS — Z00.6 EXAMINATION OF PARTICIPANT OR CONTROL IN CLINICAL RESEARCH: Primary | ICD-10-CM

## 2023-02-21 PROCEDURE — 99207 PR NO CHARGE NURSE ONLY: CPT

## 2023-02-21 NOTE — PROGRESS NOTES
[facility] Study Consent    Study Description: The purpose of this study is to collect sleep data for product research and development. We will compare the performance of the Smartwatch to a medical-grade Home Sleep Test (HST). We hope to learn whether the Smartwatch can be used to track sleep quality at home.        Audi Aguilar a 62 year old male, was on-site  today to discuss participation in the [facility] sleep data collection study.     The consent form was reviewed with the patient.     The review of the study included:    Study Purpose     COVID-19 Criteria     Participant Responsibilities      Study Data and Devices    Benefits and Risks of Participation    Compensation and Costs of Participation    Voluntary Participation    Confidentiality     Injury and Legal Rights    Protocol Version: 3.0    The subject was queried in regards to his willingness to continue and his questions were answered to his satisfaction.     The patient has given his agreement to volunteer and participate in the above noted study.     The Consent  and HIPAA form version 3.0 6-JAN-2023 was signed at 08:56  on  21-FEB-2023 with the Clinical Research Unit of [facility].     A copy of the [facility] consent will be placed in subject's medical record. A copy of the consent form was given to the subject today.    Study data is directly entered into [employer] and [employer] per protocol.     No study procedures were done prior to Audi Aguilar providing informed consent.       21-FEB-2023    Radha Salomon

## 2023-02-21 NOTE — PROGRESS NOTES
[facility] In-Person Study Note    Study Description: The purpose of this study is to collect sleep data for product research and development. We will compare the performance of the Smartwatch to a medical-grade Home Sleep Test (HST). We hope to learn whether the Smartwatch can be used to track sleep quality at home.       Subject ID:      SCREENING     Demographic Info  Audi Aguilar   1960          62 year old  male    Race: White  Ethnicity: Non-/     Sibley Scale: OBAN Sibley: 1    Medical History:  Past Medical History:   Diagnosis Date     Depressive disorder, not elsewhere classified      Hypertension     Heart attack     Migraine, unspecified, without mention of intractable migraine without mention of status migrainosus      Uncomplicated asthma     last attack over 30 years ago       Sleep Apnea Diagnosis: No    Allergies:  Allergies   Allergen Reactions     No Known Drug Allergies         Current Medications:     Review of your medicines          Accurate as of February 21, 2023  8:57 AM. If you have any questions, ask your nurse or doctor.            CONTINUE these medicines which have NOT CHANGED      Dose / Directions   losartan 50 MG tablet  Commonly known as: COZAAR  Used for: HTN, goal below 140/90   Start Date 3/15/22; Ongoing   Dose: 50 mg  Take 1 tablet (50 mg) by mouth daily  Quantity: 90 tablet  Refills: 0        Spirolonactone Indication: hypertension 25 mg every day  12/18/22; Ongoing  Metoprolol Indication: Heart attack 100 mg every day 2/16/23; Ongoing  Rosuvastatin calcium Indication: Heart attack 40 mg every day 12/26/22; Ongoing  Aspirin 81 mg every day Indication: Blood thinner 12/18/22; Ongoing  Clopidigrel Indication: Blood thinner 75 mg every day 12/21/22; Ongoing      Past Surgical History:  Past Surgical History:   Procedure Laterality Date     [facility]     [facility]  [facility] NONSPECIFIC PROCEDURE  1995               Vitals:  Time Subject Sat: 0900   /76 (BP Location: Left arm, Patient Position: Sitting, Cuff Size: Adult Regular)   Pulse 62   Resp 16   Ht 1.829 m (6')   Wt 90.7 kg (200 lb)   SpO2 96%   BMI 27.12 kg/m         Lifestyle:  Occupation: Retired     Do you have a Bed Partner/Co-Sleeper? Yes   Previous Sleep Study?: No       (If Yes) Initial AHI Score: N/A    Alcohol Use: 3 drinks/week  Smoking History: No      Measurements & Preferences:  Dominant Hand: Left  Preferred Watch Wrist: Right     Left Wrist:  Circumference (mm): 171   Hairiness: A: Thin Hair, Low Density   Tattoos, Moles, Scars? None    Right Wrist:   Circumference (mm): 174   Hairiness: A: Thin Hair, Low Density   Tattoos, Moles, Scars? None    Was data collected?: Yes    ENROLLMENT & DEVICES      Device IDs:  Watch ID: [id number]     Watch Size: 44 mm   Band Size: M/L  Natural Notch: 3   Secure Notch: 3   Watch Setting Worn: 3   Phone ID: [id number]  Nox ID: [id number]     Has the Subject Enrolled in the Study [place]: Yes   Confirmation of Enrollment?: Yes   Enrollment Date: 21-FEB-2023  Smartwatch Training Completed? Yes   Smartwatch Training Date: 21-FEB-2023  HSAT Training Completed? Yes   HSAT Training Date: 21-FEB-2023 21-FEB-2023  Radha Salomon

## 2023-02-21 NOTE — PROGRESS NOTES
"  [facility] Sleep Study Inclusion/Exclusion Criteria:    Study Name: [facility]  : Casie Correa MD    Study Description: The purpose of this study is to collect sleep data for product research and development. We will compare the performance of the Smartwatch to a medical-grade Home Sleep Test (HST). We hope to learn whether the Smartwatch can be used to track sleep quality at home.    Protocol Version: 3.0  22-DEC-2022     Criteria #  Inclusion Criteria (ALL MUST BE YES)  YES/NO/N/A   1  Adult (age > 18 years old) Yes   2  Subject has a reliable WiFi network (examples of \"reliable\": able to video-conference call with a clear image, able to stream movies or video without interruption, play online computer games) Yes   3  Subject has access to a computer or smartphone with audiovisual capabilities (e.g., webcam and microphone/speaker*)  Yes   4  Subject is willing to comply with the study procedures    Yes         * applicable for subjects that are remotely consented and/or trained.     Criteria # Exclusion Criteria (ALL MUST BE NO) YES/NO/N/A   1  Active COVID infection   No   2  Decisionally impaired participants (no surrogate consenting is allowed)    No   3  Not understanding written and spoken English     No   4  Open wounds(s) on the wrist and/or forearm (in area where Smartwatch would be worn) No   5  Tattoos, large moles or scars on the dorsal aspect of wrist where the Smartwatch would be worn; individuals with tattoo on only one wrist may participate, if Smartwatch is worn on non-tattooed wrist    No   6  Known sensitivity or allergy to component of the Smartwatch   No   7  Planned travel across 2 or more times zones during study participation   No   8  Planned travel away from home for more than 5 days during the study period No   9  Overnight rotating shift work     No   10  Active and adherent to treatment for sleep apnea   (e.g., CPAP, dental appliance, Hypoglossal nerve stimulator)    " No   11  Plans to start treatment for apnea within the study timeframe    No   12  Overnight supplemental oxygen use   No   13    Employed by a company that develops or sells medical and/or fitness devices (e.g., ECG monitors, wearable fitness bands, sleep monitors, etc.) or are technology journalists (e.g., professional bloggers, TV, magazine, newspaper reporters, etc.) No   14    Participated in a previous sleep study that used a wrist-worn sensor device by same sponsor  No     Patient does fulfill study inclusion criteria and no exclusion criteria are found.     Casie Correa MD    21-FEB-2023    Radha Salomon

## 2023-02-24 ENCOUNTER — VIRTUAL VISIT (OUTPATIENT)
Dept: RESEARCH | Facility: CLINIC | Age: 63
End: 2023-02-24
Payer: COMMERCIAL

## 2023-02-24 DIAGNOSIS — Z00.6 EXAMINATION OF PARTICIPANT OR CONTROL IN CLINICAL RESEARCH: Primary | ICD-10-CM

## 2023-02-24 PROCEDURE — 99207 PR NO CHARGE NURSE ONLY: CPT

## 2023-02-24 NOTE — PROGRESS NOTES
[place] HSAT Phone Visit    Study Description: The purpose of this study is to collect sleep data for product research and development. We will compare the performance of the Smartwatch to a medical-grade Home Sleep Test (HST). We hope to learn whether the Smartwatch can be used to track sleep quality at home.      Subject Number:     Study Day: Day 4    [place] Study Subject was called today to review HSAT expectations and requirements prior to their first night.     Are you on your home WiFi and have you been completing your Evening/Morning survey? Yes    Reminders Checklist:    [x] Complete Evening Task prior to HSAT Workflow/Watching the video   [x]Make sure Red light comes on, if not DO NOT ATTEMPT  [x] Secure nasal cannula and finger sensor with medical tape  [x] Nox recording turned off in the morning following HSAT night   [x] Ensure morning survey is completed for participant's data upload    Helpful Hints:  -Wear a T-shirt over the heart monitor   -Double tape device tubing/wires     Adverse Events & Con Med Assessment Performed?   [x]     (If yes, please generate adverse event report for PI to cosign)      24-FEB-2023    JACKY Johansen

## 2023-02-27 ENCOUNTER — ALLIED HEALTH/NURSE VISIT (OUTPATIENT)
Dept: RESEARCH | Facility: CLINIC | Age: 63
End: 2023-02-27
Payer: COMMERCIAL

## 2023-02-27 DIAGNOSIS — Z00.6 RESEARCH SUBJECT: Primary | ICD-10-CM

## 2023-02-27 PROCEDURE — 99207 PR NO CHARGE NURSE ONLY: CPT

## 2023-02-27 NOTE — PROGRESS NOTES
[facility] [place] HSAT Kit Return  Study Description: The purpose of this study is to collect sleep data for product research and development. We will compare the performance of the Smartwatch to a medical-grade Home Sleep Test (HST). We hope to learn whether the Smartwatch can be used to track sleep quality at home.      Subject Number:     Study Day: Week 2    Tracking Number: N/A    Compliance Questions:  Did you wear a T-Shirt over the heart monitor? No   Did you double tape device tubing/wires?No   Did you turn-off your heart monitor each morning after collection?Yes  Did all equipment function correctly and stay-on throughout night?Yes  Did you see the red light underneath the watch turn-on both nights?Yes      Participant returned HSAT kit with all devices returned. Participant verbalized understanding that if their data is unusable per sponsor, they will conduct one additional set of HSAT recordings. All other questions/concerns addressed.     Adverse Events & Con Med Assessment Performed?   [x] None    (If yes, please generate adverse event report for PI to mejia)    27-FEB-2023    Katina Angel RN

## 2023-03-10 ENCOUNTER — VIRTUAL VISIT (OUTPATIENT)
Dept: RESEARCH | Facility: CLINIC | Age: 63
End: 2023-03-10
Payer: COMMERCIAL

## 2023-03-10 ENCOUNTER — TELEPHONE (OUTPATIENT)
Dept: RESEARCH | Facility: CLINIC | Age: 63
End: 2023-03-10

## 2023-03-10 DIAGNOSIS — Z00.6 EXAMINATION OF PARTICIPANT OR CONTROL IN CLINICAL RESEARCH: Primary | ICD-10-CM

## 2023-03-10 PROCEDURE — 99207 PR NO CHARGE NURSE ONLY: CPT

## 2023-03-10 NOTE — TELEPHONE ENCOUNTER
[place] HSAT Results Phone Call  Study Description: The purpose of this study is to collect sleep data for product research and development. We will compare the performance of the Smartwatch to a medical-grade Home Sleep Test (HST). We hope to learn whether the Smartwatch can be used to track sleep quality at home.      Audi Aguilar was called today to review results of his Home Sleep Test (HSAT).     They were informed of the significance of the results and they affirmed understanding. Additionally I informed them that these results are available for review by their provider in their chart.  They had no further questions at this time.    Additional Comments:  Lowest O2 85% brief < 6 minutes; Snore 42.8 and 53.8%; HR 36-90 bpm noted to have frequent PVCs with bigeminy and quadrigeminy. He reports he is followed by cardiology, and after his heart attack in December, he is attending cardiac rehab 3 times per week until the end of March.  He also wears a fit watch, which documents his HR and Oxygen saturations.             AHI Score: AHI of 5 to less than 15 (Mild RAMONA) : Scores 12.3, 13.1, 7.3 and 7.3    Results:  NIGHT 1  Was it scorable?: Yes     Channel Presence: Scored and all 6 channels present  Select Missing Channels: N/A     (If applicable)      Analysis Start Date: 2/24/23   Analysis Duration: 7hr 58min  Analysis Start Time: 10:08 pm       Analysis Stop Time: 6:06 am   Est Total Sleep Time: 7 hr 54 min      NIGHT 1 Scorer 1 Scorer 2   Respiratory Parameters   Apnea + Hypopneas (AH)   Total    12.3 /h   13.1 /h   Supine    21.5 /h   21.5 /h   Non-Supine   10.9 /h   11.9 /h   Count   97    104        Total Total    Obstructive (OA)    2.7 /h   2 /h   Hypopneas    3.3 /h   3.5 /h   Central Apnea Hypopnea (CA+CH)    5.6 /h   6.8 /h   Oxygen Saturation (SpO2)   Oxygen Desaturation Index (WALI)    4.9 /h   4.9 /h   Minimum SpO2    85 %   85 %   SpO2 Duration < 88%    0.4 %   0.4 %   Average Desat Drop    4.7 %    4.7 %   Position & Analysis Time   Supine (in TST) Duration    61.3 min   61.3 min       NIGHT 2  Was it scorable?: Yes (If no, delete the table below)     Channel Presence: Scored and all 6 channels present  Select Missing Channels: N/A     (If applicable)    Analysis Start Date: 2/25/23   Analysis Duration: 6hr 54min  Analysis Start Time: 9:54 pm       Analysis Stop Time: 4:48 am   Est Total Sleep Time: 6hr 51min       NIGHT 2 Scorer 1 Scorer 2   Respiratory Parameters   Apnea + Hypopneas (AH)   Total    7.3 /h   7.3 /h   Supine    18.6 /h   18.6 /h   Non-Supine   3.8 /h   3.8 /h   Count   50    50        Total Total    Obstructive (OA)    0.9 /h   0.4 /h   Hypopneas    3.2 /h   3.1 /h   Central Apnea Hypopnea (CA+CH)    2 /h   3.2 /h   Oxygen Saturation (SpO2)   Oxygen Desaturation Index (WALI)    5.1 /h   5.1 /h   Minimum SpO2    88 %   88 %   SpO2 Duration < 88%    0.2 %   0.2 %   Average Desat Drop    4.3 %   4.3 %   Position & Analysis Time   Supine (in TST) Duration    96.9 min   96.9 min       10-MAR-2023    Bambi Alejandra PA-C

## 2023-03-10 NOTE — PROGRESS NOTES
" [place] Study Phone Visit    Study Description: The purpose of this study is to collect sleep data for product research and development. We will compare the performance of the Smartwatch to a medical-grade Home Sleep Test (HST). We hope to learn whether the Smartwatch can be used to track sleep quality at home.      Subject Number:     Study Day: Week 3    [place] Study Subject was called today to:    Review Compliance     Answer subject questions    Deliver study protocol reminders to subject    Reminders Checklist:   [x]  Connected to WiFi  [x]  Completing both morning and evening surveys  [x]  Watch and Phone on  near each other after completed morning survey   [x]  Take devices off before showering/getting them wet  [x]  Make sure to dismiss any update notifications. -Tap \"Not Now\"  [x]  Good HSAT   [x]  Remind them of next appointment with us         Adverse Events & Con Med Assessment Performed?   [x]     (If yes, please generate adverse event report for PI to mejia)      10-MAR-2023    Carlos Alberto Robins     "

## 2023-03-17 ENCOUNTER — VIRTUAL VISIT (OUTPATIENT)
Dept: RESEARCH | Facility: CLINIC | Age: 63
End: 2023-03-17
Payer: COMMERCIAL

## 2023-03-17 DIAGNOSIS — Z00.6 EXAMINATION OF PARTICIPANT OR CONTROL IN CLINICAL RESEARCH: Primary | ICD-10-CM

## 2023-03-17 PROCEDURE — 99207 PR NO CHARGE NURSE ONLY: CPT

## 2023-03-17 NOTE — PROGRESS NOTES
" [place] Study Phone Visit    Study Description: The purpose of this study is to collect sleep data for product research and development. We will compare the performance of the Smartwatch to a medical-grade Home Sleep Test (HST). We hope to learn whether the Smartwatch can be used to track sleep quality at home.      Subject Number:     Study Day: Week 4    [place] Study Subject was called today to:    Review Compliance     Answer subject questions    Deliver study protocol reminders to subject    Reminders Checklist:   [x]  Connected to WiFi  [x]  Completing both morning and evening surveys  [x]  Watch and Phone on  near each other after completed morning survey   [x]  Take devices off before showering/getting them wet  [x]  Make sure to dismiss any update notifications. -Tap \"Not Now\"  [x]  Good HSAT   [x]  Remind them of next appointment with us     (Applicable during last 4 days)  [x]  NO WATCH WEAR JUST SURVEY     Adverse Events & Con Med Assessment Performed?   [x]     (If yes, please generate adverse event report for PI to cosign)      17-MAR-2023    Jung Choudhary      "

## 2023-03-24 ENCOUNTER — VIRTUAL VISIT (OUTPATIENT)
Dept: RESEARCH | Facility: CLINIC | Age: 63
End: 2023-03-24
Payer: COMMERCIAL

## 2023-03-24 DIAGNOSIS — Z00.6 RESEARCH SUBJECT: Primary | ICD-10-CM

## 2023-03-24 PROCEDURE — 99207 PR NO CHARGE NURSE ONLY: CPT

## 2023-03-24 NOTE — PROGRESS NOTES
" [place] Study Phone Visit    Study Description: The purpose of this study is to collect sleep data for product research and development. We will compare the performance of the Smartwatch to a medical-grade Home Sleep Test (HST). We hope to learn whether the Smartwatch can be used to track sleep quality at home.      Subject Number:     Study Day: Week 5    [place] Study Subject was called today to:    Review Compliance     Answer subject questions    Deliver study protocol reminders to subject    Reminders Checklist:   [x]  Connected to WiFi  [x]  Completing both morning and evening surveys  [x]  Watch and Phone on  near each other after completed morning survey   [x]  Take devices off before showering/getting them wet  [x]  Make sure to dismiss any update notifications. -Tap \"Not Now\"   [x]  Remind them of next appointment with us     (Applicable during last 4 days)  [x]  NO WATCH WEAR JUST SURVEY     Adverse Events & Con Med Assessment Performed?   [x]     (If yes, please generate adverse event report for PI to cosign)      24-MAR-2023    Radha Salomon      "

## 2023-03-26 ENCOUNTER — HEALTH MAINTENANCE LETTER (OUTPATIENT)
Age: 63
End: 2023-03-26

## 2023-03-27 ENCOUNTER — ALLIED HEALTH/NURSE VISIT (OUTPATIENT)
Dept: RESEARCH | Facility: CLINIC | Age: 63
End: 2023-03-27
Payer: COMMERCIAL

## 2023-03-27 DIAGNOSIS — Z00.6 EXAMINATION OF PARTICIPANT OR CONTROL IN CLINICAL RESEARCH: Primary | ICD-10-CM

## 2023-03-27 PROCEDURE — 99207 PR NO CHARGE NURSE ONLY: CPT

## 2023-03-27 NOTE — PROGRESS NOTES
[place] Study End Note    Study Description: The purpose of this study is to collect sleep data for product research and development. We will compare the performance of the Smartwatch to a medical-grade Home Sleep Test (HST). We hope to learn whether the Smartwatch can be used to track sleep quality at home.        Study Termination/Completion Date: 27-MAR-2023  Reason for Termination (If Applicable): Completed     Subject returned all study devices today and this completes this study for the subject.    Adverse Events & Con Med Assessment Performed?   [x]       27-MAR-2023    Carlos Alberto Robins

## 2024-05-26 ENCOUNTER — HEALTH MAINTENANCE LETTER (OUTPATIENT)
Age: 64
End: 2024-05-26

## 2025-06-14 ENCOUNTER — HEALTH MAINTENANCE LETTER (OUTPATIENT)
Age: 65
End: 2025-06-14

## 2025-08-16 ENCOUNTER — HEALTH MAINTENANCE LETTER (OUTPATIENT)
Age: 65
End: 2025-08-16